# Patient Record
Sex: FEMALE | Race: WHITE | Employment: FULL TIME | URBAN - NONMETROPOLITAN AREA
[De-identification: names, ages, dates, MRNs, and addresses within clinical notes are randomized per-mention and may not be internally consistent; named-entity substitution may affect disease eponyms.]

---

## 2017-01-03 ENCOUNTER — TELEPHONE (OUTPATIENT)
Age: 37
End: 2017-01-03

## 2017-01-05 ENCOUNTER — OFFICE VISIT (OUTPATIENT)
Age: 37
End: 2017-01-05

## 2017-01-05 VITALS
HEIGHT: 68 IN | DIASTOLIC BLOOD PRESSURE: 68 MMHG | SYSTOLIC BLOOD PRESSURE: 142 MMHG | TEMPERATURE: 97.8 F | BODY MASS INDEX: 41.12 KG/M2 | OXYGEN SATURATION: 98 % | WEIGHT: 271.3 LBS | HEART RATE: 100 BPM | RESPIRATION RATE: 16 BRPM

## 2017-01-05 DIAGNOSIS — T81.31XA WOUND DEHISCENCE, INITIAL ENCOUNTER: Primary | ICD-10-CM

## 2017-01-05 PROCEDURE — 99024 POSTOP FOLLOW-UP VISIT: CPT | Performed by: NURSE PRACTITIONER

## 2017-01-05 RX ORDER — SULFAMETHOXAZOLE AND TRIMETHOPRIM 800; 160 MG/1; MG/1
1 TABLET ORAL 2 TIMES DAILY
Qty: 20 TABLET | Refills: 0 | Status: SHIPPED | OUTPATIENT
Start: 2017-01-05 | End: 2017-03-23 | Stop reason: SDUPTHER

## 2017-01-05 ASSESSMENT — ENCOUNTER SYMPTOMS
RECTAL PAIN: 0
COLOR CHANGE: 0
SORE THROAT: 0
DIARRHEA: 0
SINUS PRESSURE: 0
TROUBLE SWALLOWING: 0
CHOKING: 0
EYE ITCHING: 0
CHEST TIGHTNESS: 0
EYE REDNESS: 0
BLOOD IN STOOL: 0
WHEEZING: 0
EYE DISCHARGE: 0
EYE PAIN: 0
ANAL BLEEDING: 0
COUGH: 0
VOMITING: 0
RHINORRHEA: 0
VOICE CHANGE: 0
SHORTNESS OF BREATH: 0
STRIDOR: 0
CONSTIPATION: 0
NAUSEA: 0
FACIAL SWELLING: 0
BACK PAIN: 0
ABDOMINAL DISTENTION: 0
PHOTOPHOBIA: 0
APNEA: 0

## 2017-01-09 ENCOUNTER — TELEPHONE (OUTPATIENT)
Age: 37
End: 2017-01-09

## 2017-01-10 ENCOUNTER — TELEPHONE (OUTPATIENT)
Age: 37
End: 2017-01-10

## 2017-01-10 LAB
AEROBIC CULTURE: ABNORMAL
ANAEROBIC CULTURE: ABNORMAL
GRAM STAIN RESULT: ABNORMAL
ORGANISM: ABNORMAL

## 2017-01-19 ENCOUNTER — OFFICE VISIT (OUTPATIENT)
Age: 37
End: 2017-01-19

## 2017-01-19 VITALS
WEIGHT: 277 LBS | SYSTOLIC BLOOD PRESSURE: 118 MMHG | RESPIRATION RATE: 16 BRPM | HEART RATE: 75 BPM | BODY MASS INDEX: 41.98 KG/M2 | OXYGEN SATURATION: 98 % | DIASTOLIC BLOOD PRESSURE: 80 MMHG | TEMPERATURE: 98.8 F | HEIGHT: 68 IN

## 2017-01-19 DIAGNOSIS — Z51.89 VISIT FOR WOUND CHECK: Primary | ICD-10-CM

## 2017-01-19 PROCEDURE — 99024 POSTOP FOLLOW-UP VISIT: CPT | Performed by: NURSE PRACTITIONER

## 2017-01-19 ASSESSMENT — ENCOUNTER SYMPTOMS
SINUS PRESSURE: 0
WHEEZING: 0
ABDOMINAL PAIN: 0
SHORTNESS OF BREATH: 0
NAUSEA: 0
CHOKING: 0
COUGH: 0
APNEA: 0
ABDOMINAL DISTENTION: 0
RHINORRHEA: 0
DIARRHEA: 0
EYE REDNESS: 0
RECTAL PAIN: 0
STRIDOR: 0
CHEST TIGHTNESS: 0
VOICE CHANGE: 0
TROUBLE SWALLOWING: 0
ANAL BLEEDING: 0
VOMITING: 0
FACIAL SWELLING: 0
EYE PAIN: 0
CONSTIPATION: 0
BLOOD IN STOOL: 0
EYE ITCHING: 0
PHOTOPHOBIA: 0
COLOR CHANGE: 0
SORE THROAT: 0
EYE DISCHARGE: 0
BACK PAIN: 0

## 2017-03-22 ENCOUNTER — TELEPHONE (OUTPATIENT)
Dept: FAMILY MEDICINE CLINIC | Age: 37
End: 2017-03-22

## 2017-03-23 ENCOUNTER — OFFICE VISIT (OUTPATIENT)
Dept: FAMILY MEDICINE CLINIC | Age: 37
End: 2017-03-23

## 2017-03-23 VITALS
HEIGHT: 68 IN | HEART RATE: 76 BPM | DIASTOLIC BLOOD PRESSURE: 78 MMHG | BODY MASS INDEX: 41.92 KG/M2 | SYSTOLIC BLOOD PRESSURE: 128 MMHG | WEIGHT: 276.6 LBS | TEMPERATURE: 98.3 F

## 2017-03-23 DIAGNOSIS — J01.90 ACUTE SINUSITIS, RECURRENCE NOT SPECIFIED, UNSPECIFIED LOCATION: Primary | ICD-10-CM

## 2017-03-23 PROCEDURE — 99213 OFFICE O/P EST LOW 20 MIN: CPT | Performed by: FAMILY MEDICINE

## 2017-03-23 RX ORDER — SULFAMETHOXAZOLE AND TRIMETHOPRIM 800; 160 MG/1; MG/1
1 TABLET ORAL 2 TIMES DAILY
Qty: 20 TABLET | Refills: 0 | Status: SHIPPED | OUTPATIENT
Start: 2017-03-23 | End: 2017-12-01 | Stop reason: SDUPTHER

## 2017-12-01 ENCOUNTER — OFFICE VISIT (OUTPATIENT)
Dept: FAMILY MEDICINE CLINIC | Age: 37
End: 2017-12-01
Payer: COMMERCIAL

## 2017-12-01 VITALS
WEIGHT: 271.4 LBS | BODY MASS INDEX: 41.13 KG/M2 | TEMPERATURE: 97.8 F | SYSTOLIC BLOOD PRESSURE: 130 MMHG | HEIGHT: 68 IN | HEART RATE: 66 BPM | DIASTOLIC BLOOD PRESSURE: 82 MMHG

## 2017-12-01 DIAGNOSIS — J02.9 ACUTE VIRAL PHARYNGITIS: Primary | ICD-10-CM

## 2017-12-01 PROCEDURE — 99213 OFFICE O/P EST LOW 20 MIN: CPT | Performed by: FAMILY MEDICINE

## 2017-12-01 RX ORDER — SULFAMETHOXAZOLE AND TRIMETHOPRIM 800; 160 MG/1; MG/1
1 TABLET ORAL 2 TIMES DAILY
Qty: 20 TABLET | Refills: 0 | Status: SHIPPED | OUTPATIENT
Start: 2017-12-01 | End: 2017-12-11

## 2017-12-02 NOTE — PROGRESS NOTES
Name:  Shaggy Gerber  :    1980    Chief Complaint   Patient presents with    Cough    Sinusitis     sinus pressure       HPI:  2-3 days of sore throat and congestion. Mild cough. OTC no help. Physical Exam:      /82 (Site: Left Arm, Position: Sitting, Cuff Size: Large Adult)   Pulse 66   Temp 97.8 °F (36.6 °C) (Oral)   Ht 5' 8\" (1.727 m)   Wt 271 lb 6.4 oz (123.1 kg)   BMI 41.27 kg/m²     ENT:  TM's clear. Phar is red with edema. No nodes. Lungs are clear. Heart in RRR with no murmur. Assessment/Plan:      Pharyngitis /sinusitis      Pastor Knowles was seen today for cough and sinusitis. Diagnoses and all orders for this visit:    Acute viral pharyngitis  -     sulfamethoxazole-trimethoprim (BACTRIM DS) 800-160 MG per tablet;  Take 1 tablet by mouth 2 times daily for 10 days

## 2018-05-04 ENCOUNTER — OFFICE VISIT (OUTPATIENT)
Dept: FAMILY MEDICINE CLINIC | Age: 38
End: 2018-05-04
Payer: COMMERCIAL

## 2018-05-04 VITALS
BODY MASS INDEX: 42.16 KG/M2 | WEIGHT: 278.2 LBS | SYSTOLIC BLOOD PRESSURE: 138 MMHG | DIASTOLIC BLOOD PRESSURE: 86 MMHG | HEIGHT: 68 IN | HEART RATE: 88 BPM | TEMPERATURE: 99.1 F

## 2018-05-04 DIAGNOSIS — M77.52 TENDONITIS OF ANKLE, LEFT: Primary | ICD-10-CM

## 2018-05-04 PROCEDURE — 99213 OFFICE O/P EST LOW 20 MIN: CPT | Performed by: FAMILY MEDICINE

## 2018-05-04 ASSESSMENT — PATIENT HEALTH QUESTIONNAIRE - PHQ9
SUM OF ALL RESPONSES TO PHQ9 QUESTIONS 1 & 2: 0
SUM OF ALL RESPONSES TO PHQ QUESTIONS 1-9: 0
2. FEELING DOWN, DEPRESSED OR HOPELESS: 0
1. LITTLE INTEREST OR PLEASURE IN DOING THINGS: 0

## 2019-03-29 ENCOUNTER — HOSPITAL ENCOUNTER (OUTPATIENT)
Age: 39
Discharge: HOME | End: 2019-03-29
Payer: COMMERCIAL

## 2019-03-29 ENCOUNTER — OFFICE VISIT (OUTPATIENT)
Dept: FAMILY MEDICINE CLINIC | Age: 39
End: 2019-03-29
Payer: COMMERCIAL

## 2019-03-29 ENCOUNTER — HOSPITAL ENCOUNTER (OUTPATIENT)
Dept: GENERAL RADIOLOGY | Age: 39
Discharge: HOME | End: 2019-03-29
Payer: COMMERCIAL

## 2019-03-29 VITALS
HEART RATE: 80 BPM | WEIGHT: 290.4 LBS | HEIGHT: 68 IN | DIASTOLIC BLOOD PRESSURE: 80 MMHG | BODY MASS INDEX: 44.01 KG/M2 | SYSTOLIC BLOOD PRESSURE: 130 MMHG

## 2019-03-29 DIAGNOSIS — M79.671 RIGHT FOOT PAIN: ICD-10-CM

## 2019-03-29 DIAGNOSIS — S93.491A HIGH ANKLE SPRAIN OF RIGHT LOWER EXTREMITY, INITIAL ENCOUNTER: ICD-10-CM

## 2019-03-29 DIAGNOSIS — M25.571 ACUTE RIGHT ANKLE PAIN: ICD-10-CM

## 2019-03-29 DIAGNOSIS — M25.571 ACUTE RIGHT ANKLE PAIN: Primary | ICD-10-CM

## 2019-03-29 DIAGNOSIS — S92.354A CLOSED NONDISPLACED FRACTURE OF FIFTH METATARSAL BONE OF RIGHT FOOT, INITIAL ENCOUNTER: ICD-10-CM

## 2019-03-29 PROCEDURE — 73610 X-RAY EXAM OF ANKLE: CPT

## 2019-03-29 PROCEDURE — 99214 OFFICE O/P EST MOD 30 MIN: CPT | Performed by: FAMILY MEDICINE

## 2019-03-29 PROCEDURE — 73630 X-RAY EXAM OF FOOT: CPT

## 2019-03-29 ASSESSMENT — ENCOUNTER SYMPTOMS: BACK PAIN: 0

## 2019-03-29 ASSESSMENT — PATIENT HEALTH QUESTIONNAIRE - PHQ9
1. LITTLE INTEREST OR PLEASURE IN DOING THINGS: 0
SUM OF ALL RESPONSES TO PHQ9 QUESTIONS 1 & 2: 0
SUM OF ALL RESPONSES TO PHQ QUESTIONS 1-9: 0
SUM OF ALL RESPONSES TO PHQ QUESTIONS 1-9: 0
2. FEELING DOWN, DEPRESSED OR HOPELESS: 0

## 2019-04-23 ENCOUNTER — HOSPITAL ENCOUNTER (OUTPATIENT)
Age: 39
Discharge: HOME | End: 2019-04-23
Payer: COMMERCIAL

## 2019-04-23 ENCOUNTER — HOSPITAL ENCOUNTER (OUTPATIENT)
Dept: GENERAL RADIOLOGY | Age: 39
Discharge: HOME | End: 2019-04-23
Payer: COMMERCIAL

## 2019-04-23 DIAGNOSIS — S92.354A CLOSED NONDISPLACED FRACTURE OF FIFTH METATARSAL BONE OF RIGHT FOOT, INITIAL ENCOUNTER: ICD-10-CM

## 2019-04-23 PROCEDURE — 73630 X-RAY EXAM OF FOOT: CPT

## 2019-04-24 ENCOUNTER — OFFICE VISIT (OUTPATIENT)
Dept: FAMILY MEDICINE CLINIC | Age: 39
End: 2019-04-24
Payer: COMMERCIAL

## 2019-04-24 VITALS
HEIGHT: 68 IN | HEART RATE: 88 BPM | DIASTOLIC BLOOD PRESSURE: 84 MMHG | WEIGHT: 288.8 LBS | SYSTOLIC BLOOD PRESSURE: 140 MMHG | BODY MASS INDEX: 43.77 KG/M2

## 2019-04-24 DIAGNOSIS — S92.354A CLOSED NONDISPLACED FRACTURE OF FIFTH METATARSAL BONE OF RIGHT FOOT, INITIAL ENCOUNTER: Primary | ICD-10-CM

## 2019-04-24 DIAGNOSIS — M79.671 RIGHT FOOT PAIN: ICD-10-CM

## 2019-04-24 PROCEDURE — 99213 OFFICE O/P EST LOW 20 MIN: CPT | Performed by: FAMILY MEDICINE

## 2019-04-24 NOTE — PROGRESS NOTES
[facility] [place] [address] [address]  Dept: [phone]  Dept Fax: [phone]  Loc: [phone]  PROGRESS NOTE      Visit Date: 4/24/2019    Cooper Rendon is a 45 y.o. female who presents today for:  Chief Complaint   Patient presents with    Ankle Injury       Subjective:  HPI    4 week follow-up for right foot base of fifth metatarsal fracture (pseudo-Flores fracture). She has been in a walking boot weightbearing as tolerated for the past 4 weeks. She is status post about 7 weeks since injury. Repeat x-ray was performed yesterday. She broke the straps on her tall walking boot and now is using a short walking boot. She has been icing. She has lateral foot swelling and pain at base of 5th metatarsal.   She is working 5 days per week and is on her feet all day. Review of Systems   Musculoskeletal: Positive for arthralgias and gait problem. Neurological: Negative for weakness and numbness.      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 Nelson [facility], [facility])    ENDOMETRIAL ABLATION  2012    HERNIA REPAIR  12/16/2016    LAPAROSCOPIC VENTRAL WITH MESH    [facility]    [facility]    TUBAL LIGATION  2012     Family History   Problem Relation Age of Onset    High Blood Pressure Father     Heart Disease Father     High Blood Pressure Brother     Cancer Neg Hx     Diabetes Neg Hx     Stroke Neg Hx      Social History     Tobacco Use    Smoking status: Never Smoker    Smokeless tobacco: Never Used   Substance Use Topics    Alcohol use: Yes     Comment: occasional      Current Outpatient Medications   Medication Sig Dispense Refill    Probiotic Product (PROBIOTIC PO) Take by mouth daily      furosemide (LASIX) 20 MG tablet Take 1 tablet by mouth daily as needed 30 tablet 1     No current facility-administered medications for this visit. Allergies   Allergen Reactions    Ceclor [Cefaclor] Rash     Health Maintenance   Topic Date Due    Hib Vaccine (1 of 1 - Risk 1-dose series) 12/11/1981    Meningococcal (ACWY) Vaccine (1 - Risk 2-dose series) 09/11/1982    Pneumococcal 0-64 years Vaccine (1 of 3 - PCV13) 09/11/1986    Varicella Vaccine (1 of 2 - 13+ 2-dose series) 09/11/1993    HIV screen  09/11/1995    DTaP/Tdap/Td vaccine (1 - Tdap) 07/03/2013    Potassium monitoring  12/18/2017    Creatinine monitoring  12/18/2017    Cervical cancer screen  10/20/2018    Flu vaccine (Season Ended) 09/01/2019    HPV vaccine  Aged Out       Objective:  BP (!) 154/92 (Site: Left Upper Arm, Position: Sitting, Cuff Size: Large Adult)   Pulse 88   Ht 5' 8\" (1.727 m)   Wt 288 lb 12.8 oz (131 kg)   BMI 43.91 kg/m²   Physical Exam   Constitutional: She is oriented to person, place, and time. She appears well-developed and well-nourished. Neurological: She is alert and oriented to person, place, and time. Psychiatric: She has a normal mood and affect. Her behavior is normal.   Vitals reviewed. Right ankle: Inspection:   Swelling of lateral ankle, syndesmosis, and around base of 5th metatarsal.    No ecchymosis or redness. ROM:  Dorsiflexion 10 degrees  Plantarflexion WNL  Inversion WNL  Eversion WNL   Tenderness:  ttp of base of 5th metarsal. No ttp of lateral malleolus and syndesmosis.    No tenderness of proximal fibula, medial malleolus, or deltoid ligament. Strength:  deferred   Bump Test of Heel:  negative   Squeeze Test:   negative   Anterior Drawer:  positive   Talar Tilt:  positive   External Rotation test:  positive      Right foot:  Positive long bone compression of 5th ray. Positive tap test of 5th metatarsal.      Distal sensation and pulses in RLE. Calf is soft and nontender. Negative Deniz sign of right leg.      Imaging:  Right foot xrays:  from 4/23/19. Obtained and reviewed. Base of 5th metatarsal fracture in distal zone1 that is minimally displaced. there appears to be some bridging trabeculation present. Calcaneal spur is present. Impression/Plan:  1. Closed nondisplaced fracture of fifth metatarsal bone of right foot, initial encounter  Distal zone 1 of 5 metatarsal base fracture (pseudo-Flores fracture). Status post 7 weeks since injury and about 4 weeks since initiating treatment. Continue short walking boot with weightbearing as tolerated. Still having pain. X-ray shows early signs of healing. Reassured patient. Discussed that this can take 12 weeks to heal.  Discussed the possibility of a nonunion or malunion and in that case we would refer her to orthopedics for possible ORIF. Repeat xray in 4 weeks prior to appt. - XR FOOT RIGHT (MIN 3 VIEWS); Future    2. Right foot pain  As above      They voiced understanding. All questions answered. They agreed with treatment plan. See patient instructions for any educational materials that may have been given. Discussed use, benefit, and side effects of prescribed medications. Reviewed health maintenance. (Please note that portions of this note may have been completed with a voice recognition program.  Efforts were made to edit the dictation but occasionally words are mis-transcribed.)    Return in about 1 month (around 5/22/2019).       Electronically signed by Leander Smith MD on 4/24/2019 at 8:59 AM

## 2019-05-24 ENCOUNTER — HOSPITAL ENCOUNTER (OUTPATIENT)
Age: 39
Discharge: HOME | End: 2019-05-24
Payer: COMMERCIAL

## 2019-05-24 ENCOUNTER — HOSPITAL ENCOUNTER (OUTPATIENT)
Dept: GENERAL RADIOLOGY | Age: 39
Discharge: HOME | End: 2019-05-24
Payer: COMMERCIAL

## 2019-05-24 DIAGNOSIS — S92.354A CLOSED NONDISPLACED FRACTURE OF FIFTH METATARSAL BONE OF RIGHT FOOT, INITIAL ENCOUNTER: ICD-10-CM

## 2019-05-24 PROCEDURE — 73630 X-RAY EXAM OF FOOT: CPT

## 2019-05-28 ENCOUNTER — OFFICE VISIT (OUTPATIENT)
Dept: FAMILY MEDICINE CLINIC | Age: 39
End: 2019-05-28
Payer: COMMERCIAL

## 2019-05-28 VITALS
HEART RATE: 74 BPM | WEIGHT: 286 LBS | BODY MASS INDEX: 43.35 KG/M2 | DIASTOLIC BLOOD PRESSURE: 76 MMHG | SYSTOLIC BLOOD PRESSURE: 122 MMHG | HEIGHT: 68 IN

## 2019-05-28 DIAGNOSIS — S92.354A CLOSED NONDISPLACED FRACTURE OF FIFTH METATARSAL BONE OF RIGHT FOOT, INITIAL ENCOUNTER: Primary | ICD-10-CM

## 2019-05-28 DIAGNOSIS — M79.671 RIGHT FOOT PAIN: ICD-10-CM

## 2019-05-28 PROCEDURE — 99213 OFFICE O/P EST LOW 20 MIN: CPT | Performed by: FAMILY MEDICINE

## 2019-05-28 NOTE — PROGRESS NOTES
[facility]  [facility] [address] [address]  Dept: [phone]  Dept Fax: [phone]  Loc: [phone]  PROGRESS NOTE      Visit Date: 5/28/2019    Phoenix Wang is a 45 y.o. female who presents today for:  Chief Complaint   Patient presents with    1 Month Follow-Up     rt foot fracture much better       Subjective:  HPI    4 week follow-up for right foot base of fifth metatarsal fracture (pseudo-Flores fracture). She has been in a walking boot weightbearing as tolerated for the past 8 weeks. She is status post about 11 weeks since injury. Repeat x-ray was performed last week. Having less the pain the past 1 week. She is working 5 days per week and is on her feet all day. Review of Systems   Musculoskeletal: Positive for arthralgias and gait problem. Neurological: Negative for weakness and numbness.      Patient Active Problem List   Diagnosis    Hypertension    GERD (gastroesophageal reflux disease)    Thrombocytopenia (HCC)    Status post splenectomy---2015    Ventral hernia without obstruction or gangrene    Closed nondisplaced fracture of fifth right metatarsal bone    High ankle sprain of right lower extremity     Past Medical History:   Diagnosis Date    Hx of blood clots 2012    LUNGS    Pancreatitis     PE (pulmonary embolism) 2012    Platelets decreased [facility])       Past Surgical History:   Procedure Laterality Date    CHOLECYSTECTOMY  21 Nov 2014    Cholecystectomy Laparoscopic, converted to open ([facility]. Dahlia Ambriz, [facility])    ENDOMETRIAL ABLATION  2012    HERNIA REPAIR  12/16/2016    LAPAROSCOPIC VENTRAL WITH MESH    S[facility]EN[facility]OMY  3-27-15    [facility]    TUBAL LIGATION  2012     Family History   Problem Relation Age of Onset    High Blood Pressure Father     Heart Disease Father     High Blood Pressure Brother     Cancer Neg Hx     Diabetes Neg Hx     Stroke Neg Hx      Social History     Tobacco Use    Smoking status: Never Smoker    Smokeless tobacco: Never Used   Substance Use Topics    Alcohol use: Yes     Comment: occasional      Current Outpatient Medications   Medication Sig Dispense Refill    Probiotic Product (PROBIOTIC PO) Take by mouth daily      furosemide (LASIX) 20 MG tablet Take 1 tablet by mouth daily as needed 30 tablet 1     No current facility-administered medications for this visit. Allergies   Allergen Reactions    Ceclor [Cefaclor] Rash     Health Maintenance   Topic Date Due    Hib Vaccine (1 of 1 - Risk 1-dose series) 12/11/1981    Meningococcal (ACWY) Vaccine (1 - Risk 2-dose series) 09/11/1982    Pneumococcal 0-64 years Vaccine (1 of 3 - PCV13) 09/11/1986    Varicella Vaccine (1 of 2 - 13+ 2-dose series) 09/11/1993    HIV screen  09/11/1995    DTaP/Tdap/Td vaccine (1 - Tdap) 07/03/2013    Potassium monitoring  12/18/2017    Creatinine monitoring  12/18/2017    Cervical cancer screen  10/20/2018    Flu vaccine (Season Ended) 09/01/2019    HPV vaccine  Aged Out       Objective:  /76 (Site: Left Upper Arm, Position: Sitting, Cuff Size: Large Adult)   Pulse 74   Ht 5' 8\" (1.727 m)   Wt 286 lb (129.7 kg)   BMI 43.49 kg/m²   Physical Exam   Constitutional: She is oriented to person, place, and time. She appears well-developed and well-nourished. Neurological: She is alert and oriented to person, place, and time. Psychiatric: She has a normal mood and affect. Her behavior is normal.   Vitals reviewed. Right ankle: Inspection:   Swelling of lateral ankle. No ecchymosis or redness. ROM:  Dorsiflexion 10 degrees  Plantarflexion WNL  Inversion WNL  Eversion WNL   Tenderness:  ttp of base of 5th metarsal. mild ttp of lateral malleolus. No ttp syndesmosis.     Strength:  deferred   Squeeze Test:   negative   Anterior Drawer:  positive   Talar Tilt:  positive   External Rotation test:  negative      Right foot:  negative long bone compression of 5th ray.  negative tap test of 5th metatarsal.      Distal sensation and pulses in RLE. Calf is soft and nontender.     Imaging:  Right foot xrays:  from 5/24/19. Obtained and reviewed. Base of 5th metatarsal fracture in distal zone 1 that is minimally displaced. no obvious callus formation. Distal fibula avulsion fracture is present. Calcaneal spur is present. Impression/Plan:  1. Closed nondisplaced fracture of fifth metatarsal bone of right foot, initial encounter  Distal zone 1 of 5 metatarsal base fracture (pseudo-Flores fracture). Status post11 weeks since injury and about 8 weeks since initiating treatment. No xray evidence of elsie healing. Continue short walking boot with weightbearing as tolerated. Reassured patient. Discussed that this can take 12 weeks to heal.  Discussed the possibility of a nonunion or malunion and in that case we would refer her to orthopedics for possible ORIF. She wants to avoid surgery. Repeat xray in 5 weeks prior to appt. - XR FOOT RIGHT (MIN 3 VIEWS); Future    2. Right foot pain  - XR FOOT RIGHT (MIN 3 VIEWS); Future    They voiced understanding. All questions answered. They agreed with treatment plan. See patient instructions for any educational materials that may have been given. Discussed use, benefit, and side effects of prescribed medications. Reviewed health maintenance. (Please note that portions of this note may have been completed with a voice recognition program.  Efforts were made to edit the dictation but occasionally words are mis-transcribed.)    Return in about 5 weeks (around 7/2/2019) for right foot pain. Consider CT foot and ortho referral if no xray evidence of healing at next appt.        Electronically signed by Gerardo Laurent MD on 5/28/2019 at 9:36 AM

## 2019-05-28 NOTE — LETTER
[facility]  [address]  Office #:  [id number] Grady Kamara MD      05/28/19    Patient: Huma Chatterjee   YOB: 1980   Date of Visit: 05/28/19     To Whom it May Concern:    Huma Chatterjee was seen in my clinic on 05/28/19. She may return to work on today. If you have any questions or concerns, please don't hesitate to call.     Sincerely,         Amirah Begum MD

## 2019-06-28 ENCOUNTER — TELEPHONE (OUTPATIENT)
Dept: FAMILY MEDICINE CLINIC | Age: 39
End: 2019-06-28

## 2019-06-28 ENCOUNTER — HOSPITAL ENCOUNTER (OUTPATIENT)
Age: 39
Discharge: HOME | End: 2019-06-28
Payer: COMMERCIAL

## 2019-06-28 ENCOUNTER — HOSPITAL ENCOUNTER (OUTPATIENT)
Dept: GENERAL RADIOLOGY | Age: 39
Discharge: HOME | End: 2019-06-28
Payer: COMMERCIAL

## 2019-06-28 DIAGNOSIS — M79.671 RIGHT FOOT PAIN: ICD-10-CM

## 2019-06-28 DIAGNOSIS — S92.354G CLOSED NONDISPLACED FRACTURE OF FIFTH METATARSAL BONE OF RIGHT FOOT WITH DELAYED HEALING, SUBSEQUENT ENCOUNTER: Primary | ICD-10-CM

## 2019-06-28 DIAGNOSIS — S92.354A CLOSED NONDISPLACED FRACTURE OF FIFTH METATARSAL BONE OF RIGHT FOOT, INITIAL ENCOUNTER: ICD-10-CM

## 2019-06-28 PROCEDURE — 73630 X-RAY EXAM OF FOOT: CPT

## 2019-07-03 ENCOUNTER — HOSPITAL ENCOUNTER (OUTPATIENT)
Dept: CT IMAGING | Age: 39
Discharge: HOME | End: 2019-07-03
Payer: COMMERCIAL

## 2019-07-03 ENCOUNTER — OFFICE VISIT (OUTPATIENT)
Dept: FAMILY MEDICINE CLINIC | Age: 39
End: 2019-07-03
Payer: COMMERCIAL

## 2019-07-03 VITALS
BODY MASS INDEX: 43.65 KG/M2 | DIASTOLIC BLOOD PRESSURE: 82 MMHG | HEIGHT: 68 IN | WEIGHT: 288 LBS | SYSTOLIC BLOOD PRESSURE: 130 MMHG

## 2019-07-03 DIAGNOSIS — S92.354G CLOSED NONDISPLACED FRACTURE OF FIFTH METATARSAL BONE OF RIGHT FOOT WITH DELAYED HEALING, SUBSEQUENT ENCOUNTER: ICD-10-CM

## 2019-07-03 DIAGNOSIS — M79.671 RIGHT FOOT PAIN: ICD-10-CM

## 2019-07-03 DIAGNOSIS — S92.354K CLOSED NONDISPLACED FRACTURE OF FIFTH METATARSAL BONE OF RIGHT FOOT WITH NONUNION, SUBSEQUENT ENCOUNTER: Primary | ICD-10-CM

## 2019-07-03 PROCEDURE — 73700 CT LOWER EXTREMITY W/O DYE: CPT

## 2019-07-03 PROCEDURE — 99213 OFFICE O/P EST LOW 20 MIN: CPT | Performed by: FAMILY MEDICINE

## 2019-07-08 ENCOUNTER — TELEPHONE (OUTPATIENT)
Dept: FAMILY MEDICINE CLINIC | Age: 39
End: 2019-07-08

## 2020-08-19 ENCOUNTER — OFFICE VISIT (OUTPATIENT)
Dept: FAMILY MEDICINE CLINIC | Age: 40
End: 2020-08-19
Payer: COMMERCIAL

## 2020-08-19 ENCOUNTER — HOSPITAL ENCOUNTER (OUTPATIENT)
Age: 40
Discharge: HOME | End: 2020-08-19
Payer: COMMERCIAL

## 2020-08-19 VITALS
SYSTOLIC BLOOD PRESSURE: 132 MMHG | HEIGHT: 67 IN | HEART RATE: 104 BPM | TEMPERATURE: 97.9 F | OXYGEN SATURATION: 97 % | DIASTOLIC BLOOD PRESSURE: 82 MMHG | BODY MASS INDEX: 43.44 KG/M2 | WEIGHT: 276.8 LBS

## 2020-08-19 DIAGNOSIS — R10.13 EPIGASTRIC PAIN: ICD-10-CM

## 2020-08-19 LAB
BASOPHILS # BLD: 0.4 %
BASOPHILS ABSOLUTE: 0 THOU/MM3 (ref 0–0.1)
EOSINOPHIL # BLD: 2.6 %
EOSINOPHILS ABSOLUTE: 0.2 THOU/MM3 (ref 0–0.4)
ERYTHROCYTE [DISTWIDTH] IN BLOOD BY AUTOMATED COUNT: 13.5 % (ref 11.5–14.5)
ERYTHROCYTE [DISTWIDTH] IN BLOOD BY AUTOMATED COUNT: 50.5 FL (ref 35–45)
HCT VFR BLD CALC: 43.2 % (ref 37–47)
HEMOGLOBIN: 14.2 GM/DL (ref 12–16)
IMMATURE GRANS (ABS): 0.01 THOU/MM3 (ref 0–0.07)
IMMATURE GRANULOCYTES: 0.1 %
LYMPHOCYTES # BLD: 21.6 %
LYMPHOCYTES ABSOLUTE: 1.7 THOU/MM3 (ref 1–4.8)
MCH RBC QN AUTO: 33.3 PG (ref 26–33)
MCHC RBC AUTO-ENTMCNC: 32.9 GM/DL (ref 32.2–35.5)
MCV RBC AUTO: 101.2 FL (ref 81–99)
MONOCYTES # BLD: 19.6 %
MONOCYTES ABSOLUTE: 1.5 THOU/MM3 (ref 0.4–1.3)
NUCLEATED RED BLOOD CELLS: 0 /100 WBC
PLATELET # BLD: 234 THOU/MM3 (ref 130–400)
PMV BLD AUTO: 11.2 FL (ref 9.4–12.4)
RBC # BLD: 4.27 MILL/MM3 (ref 4.2–5.4)
SEG NEUTROPHILS: 55.7 %
SEGMENTED NEUTROPHILS ABSOLUTE COUNT: 4.3 THOU/MM3 (ref 1.8–7.7)
WBC # BLD: 7.7 THOU/MM3 (ref 4.8–10.8)

## 2020-08-19 PROCEDURE — 36415 COLL VENOUS BLD VENIPUNCTURE: CPT

## 2020-08-19 PROCEDURE — 82150 ASSAY OF AMYLASE: CPT

## 2020-08-19 PROCEDURE — 80053 COMPREHEN METABOLIC PANEL: CPT

## 2020-08-19 PROCEDURE — 99213 OFFICE O/P EST LOW 20 MIN: CPT | Performed by: FAMILY MEDICINE

## 2020-08-19 PROCEDURE — 83690 ASSAY OF LIPASE: CPT

## 2020-08-19 PROCEDURE — 85025 COMPLETE CBC W/AUTO DIFF WBC: CPT

## 2020-08-19 RX ORDER — OMEPRAZOLE 40 MG/1
40 CAPSULE, DELAYED RELEASE ORAL DAILY
Qty: 30 CAPSULE | Refills: 2 | Status: SHIPPED | OUTPATIENT
Start: 2020-08-19 | End: 2020-09-04 | Stop reason: SDUPTHER

## 2020-08-19 RX ORDER — SUCRALFATE 1 G/1
1 TABLET ORAL 4 TIMES DAILY
Qty: 120 TABLET | Refills: 0 | Status: SHIPPED | OUTPATIENT
Start: 2020-08-19 | End: 2020-09-04

## 2020-08-19 ASSESSMENT — ENCOUNTER SYMPTOMS
NAUSEA: 0
RECTAL PAIN: 0
WHEEZING: 0
ABDOMINAL PAIN: 1
APNEA: 0
DIARRHEA: 0
SHORTNESS OF BREATH: 0
COUGH: 0
SORE THROAT: 0
CONSTIPATION: 0
ABDOMINAL DISTENTION: 0
VOMITING: 0
TROUBLE SWALLOWING: 0

## 2020-08-19 NOTE — PROGRESS NOTES
[address] [place] [address] [address], [place]  Phone:  [phone]  TJA:[phone]       Name: Calvin Dumont  : 1980    Chief Complaint   Patient presents with    Abdominal Pain     pt states the pain radiates into the back    Bloated       HPI:     Calvin Dumont is a 44 y.o. female who presents today for     HPI    Saturday started with pain in her epigastric area, drinking more coffee/lemonade. 2 jobs. Lots of stress. Yesterday morning pain was moderate to severe. H/o HH hernia, s/p noemy  Standing hurtings. Famotidine helping some   Pain into her back is concerning. No N/V. Stool loser than normal, diet has been changed. Taking probiotic. No melena/hematochezia/hemoptysis. No fam hx pancreatitis. She has personal history of pancreatitis but it was felt to be related to gallbladder disease  Past Surgical History:   Procedure Laterality Date    CHOLECYSTECTOMY  2014    Cholecystectomy Laparoscopic, converted to open ([facility]. Елена Coulter, [facility])    ENDOMETRIAL ABLATION      HERNIA REPAIR  2016    LAPAROSCOPIC VENTRAL WITH MESH    SPLENECTOMY  3-27-15    [facility]    TUBAL LIGATION           Current Outpatient Medications:     sucralfate (CARAFATE) 1 GM tablet, Take 1 tablet by mouth 4 times daily, Disp: 120 tablet, Rfl: 0    omeprazole (PRILOSEC) 40 MG delayed release capsule, Take 1 capsule by mouth daily, Disp: 30 capsule, Rfl: 2    Probiotic Product (PROBIOTIC PO), Take by mouth daily, Disp: , Rfl:     furosemide (LASIX) 20 MG tablet, Take 1 tablet by mouth daily as needed, Disp: 30 tablet, Rfl: 1    Allergies   Allergen Reactions    Ceclor [Cefaclor] Rash       Review of Systems   Constitutional: Negative for fatigue and fever. HENT: Negative for sore throat and trouble swallowing. Eyes: Negative for visual disturbance. Respiratory: Negative for apnea, cough, shortness of breath and wheezing.     Cardiovascular: Negative for chest pain and leg swelling. Gastrointestinal: Positive for abdominal pain. Negative for abdominal distention, constipation, diarrhea, nausea, rectal pain and vomiting. Genitourinary: Negative for difficulty urinating. Psychiatric/Behavioral: The patient is not nervous/anxious. Objective:     /82 (Site: Left Upper Arm, Position: Sitting)   Pulse 104   Temp 97.9 °F (36.6 °C)   Ht 5' 7\" (1.702 m)   Wt 276 lb 12.8 oz (125.6 kg)   SpO2 97%   BMI 43.35 kg/m²     Physical Exam  Constitutional:       General: She is not in acute distress. Appearance: Normal appearance. She is not ill-appearing. Cardiovascular:      Rate and Rhythm: Normal rate and regular rhythm. Heart sounds: No murmur. Pulmonary:      Effort: Pulmonary effort is normal. No respiratory distress. Breath sounds: Normal breath sounds. No wheezing. Abdominal:      General: Abdomen is flat. There is no distension. Palpations: Abdomen is soft. There is no mass. Tenderness: There is abdominal tenderness (epigastric region with radiation to the megan). There is no guarding. Hernia: No hernia is present. Musculoskeletal:         General: No swelling. Neurological:      Mental Status: She is alert. Psychiatric:         Mood and Affect: Mood normal.         Thought Content: Thought content normal.         Judgment: Judgment normal.         Assessment/Plan:     Nicholas Heredia was seen today for abdominal pain and bloated. Diagnoses and all orders for this visit:    Epigastric pain  -     Comprehensive Metabolic Panel; Future  -     CBC Auto Differential; Future  -     Lipase; Future  -     Amylase; Future  -     sucralfate (CARAFATE) 1 GM tablet; Take 1 tablet by mouth 4 times daily  -     omeprazole (PRILOSEC) 40 MG delayed release capsule; Take 1 capsule by mouth daily    History of peptic ulcer disease  -     sucralfate (CARAFATE) 1 GM tablet;  Take 1 tablet by mouth 4 times daily  -     omeprazole (PRILOSEC) 40 MG delayed release capsule; Take 1 capsule by mouth daily    Patient with significant history of abdominal issues. Due to the pain and radiation will do work-up as above. Currently her abdomen does not suggest a bowel obstruction or something that would benefit from a imaging study. We will start medication above and if not improving and laboratory work-up is unremarkable then will consider an EGD with referral to specialist.  H. pylori testing also considered. Long-term challenge is with increase acidity due to stress would be stress management. Return for pending work up and improvement of condition. No future appointments.      Electronically signed by Stef Fields MD on 8/19/2020 at 5:16 PM

## 2020-08-19 NOTE — LETTER
[address] [facility]  [address]  [facility] [address]  Phone: [phone]  Fax: [phone]    Bridget Goodell, MD        August 19, 2020     Patient: Raz Moreno   YOB: 1980   Date of Visit: 8/19/2020       To Whom It May Concern: It is my medical opinion that Leonie Yates may return to work on 8/22/2020. If you have any questions or concerns, please don't hesitate to call.     Sincerely,        Bridget Goodell, MD

## 2020-08-20 LAB
ALBUMIN SERPL-MCNC: 4 G/DL (ref 3.5–5.1)
ALP BLD-CCNC: 85 U/L (ref 38–126)
ALT SERPL-CCNC: 23 U/L (ref 11–66)
AMYLASE: 54 U/L (ref 20–104)
ANION GAP SERPL CALCULATED.3IONS-SCNC: 12 MEQ/L (ref 8–16)
AST SERPL-CCNC: 27 U/L (ref 5–40)
BILIRUB SERPL-MCNC: 0.6 MG/DL (ref 0.3–1.2)
BUN BLDV-MCNC: 11 MG/DL (ref 7–22)
CALCIUM SERPL-MCNC: 9.2 MG/DL (ref 8.5–10.5)
CHLORIDE BLD-SCNC: 105 MEQ/L (ref 98–111)
CO2: 24 MEQ/L (ref 23–33)
CREAT SERPL-MCNC: 0.6 MG/DL (ref 0.4–1.2)
GFR SERPL CREATININE-BSD FRML MDRD: > 90 ML/MIN/1.73M2
GLUCOSE BLD-MCNC: 86 MG/DL (ref 70–108)
LIPASE: 33.3 U/L (ref 5.6–51.3)
POTASSIUM SERPL-SCNC: 4.3 MEQ/L (ref 3.5–5.2)
SODIUM BLD-SCNC: 141 MEQ/L (ref 135–145)
TOTAL PROTEIN: 7.4 G/DL (ref 6.1–8)

## 2020-08-24 ENCOUNTER — APPOINTMENT (OUTPATIENT)
Dept: MRI IMAGING | Age: 40
DRG: 438 | End: 2020-08-24
Payer: COMMERCIAL

## 2020-08-24 ENCOUNTER — APPOINTMENT (OUTPATIENT)
Dept: CT IMAGING | Age: 40
DRG: 438 | End: 2020-08-24
Payer: COMMERCIAL

## 2020-08-24 ENCOUNTER — APPOINTMENT (OUTPATIENT)
Dept: ULTRASOUND IMAGING | Age: 40
DRG: 438 | End: 2020-08-24
Payer: COMMERCIAL

## 2020-08-24 ENCOUNTER — HOSPITAL ENCOUNTER (INPATIENT)
Age: 40
LOS: 4 days | Discharge: HOME | DRG: 438 | End: 2020-08-28
Attending: FAMILY MEDICINE | Admitting: INTERNAL MEDICINE
Payer: COMMERCIAL

## 2020-08-24 PROBLEM — K85.90 ACUTE PANCREATITIS WITHOUT INFECTION OR NECROSIS: Status: ACTIVE | Noted: 2020-08-24

## 2020-08-24 PROBLEM — K85.90 ACUTE PANCREATITIS: Status: ACTIVE | Noted: 2020-08-24

## 2020-08-24 LAB
ALBUMIN SERPL-MCNC: 3.8 G/DL (ref 3.5–5.1)
ALP BLD-CCNC: 251 U/L (ref 38–126)
ALT SERPL-CCNC: 205 U/L (ref 11–66)
AMYLASE: 37 U/L (ref 20–104)
ANION GAP SERPL CALCULATED.3IONS-SCNC: 11 MEQ/L (ref 8–16)
AST SERPL-CCNC: 216 U/L (ref 5–40)
BACTERIA: ABNORMAL
BASOPHILS # BLD: 0.3 %
BASOPHILS ABSOLUTE: 0 THOU/MM3 (ref 0–0.1)
BILIRUB SERPL-MCNC: 1.9 MG/DL (ref 0.3–1.2)
BILIRUBIN URINE: NEGATIVE
BLOOD, URINE: ABNORMAL
BUN BLDV-MCNC: 11 MG/DL (ref 7–22)
CALCIUM SERPL-MCNC: 9.3 MG/DL (ref 8.5–10.5)
CASTS: ABNORMAL /LPF
CASTS: ABNORMAL /LPF
CHARACTER, URINE: CLEAR
CHLORIDE BLD-SCNC: 99 MEQ/L (ref 98–111)
CO2: 27 MEQ/L (ref 23–33)
COLOR: YELLOW
CREAT SERPL-MCNC: 0.6 MG/DL (ref 0.4–1.2)
CRYSTALS: ABNORMAL
EOSINOPHIL # BLD: 0.5 %
EOSINOPHILS ABSOLUTE: 0 THOU/MM3 (ref 0–0.4)
EPITHELIAL CELLS, UA: ABNORMAL /HPF
ERYTHROCYTE [DISTWIDTH] IN BLOOD BY AUTOMATED COUNT: 12.7 % (ref 11.5–14.5)
ERYTHROCYTE [DISTWIDTH] IN BLOOD BY AUTOMATED COUNT: 45.9 FL (ref 35–45)
GFR SERPL CREATININE-BSD FRML MDRD: > 90 ML/MIN/1.73M2
GLUCOSE BLD-MCNC: 87 MG/DL (ref 70–108)
GLUCOSE, URINE: NEGATIVE MG/DL
HCT VFR BLD CALC: 42.5 % (ref 37–47)
HEMOGLOBIN: 14.4 GM/DL (ref 12–16)
IMMATURE GRANS (ABS): 0.02 THOU/MM3 (ref 0–0.07)
IMMATURE GRANULOCYTES: 0.3 %
KETONES, URINE: 15
LEUKOCYTE EST, POC: ABNORMAL
LIPASE: 15.7 U/L (ref 5.6–51.3)
LYMPHOCYTES # BLD: 18.5 %
LYMPHOCYTES ABSOLUTE: 1.5 THOU/MM3 (ref 1–4.8)
MCH RBC QN AUTO: 33.6 PG (ref 26–33)
MCHC RBC AUTO-ENTMCNC: 33.9 GM/DL (ref 32.2–35.5)
MCV RBC AUTO: 99.1 FL (ref 81–99)
MISCELLANEOUS LAB TEST RESULT: ABNORMAL
MONOCYTES # BLD: 17.3 %
MONOCYTES ABSOLUTE: 1.4 THOU/MM3 (ref 0.4–1.3)
NITRITE, URINE: NEGATIVE
NUCLEATED RED BLOOD CELLS: 0 /100 WBC
OSMOLALITY CALCULATION: 272.6 MOSMOL/KG (ref 275–300)
PH UA: 5.5 (ref 5–9)
PLATELET # BLD: 295 THOU/MM3 (ref 130–400)
PMV BLD AUTO: 10.1 FL (ref 9.4–12.4)
POTASSIUM REFLEX MAGNESIUM: 4.2 MEQ/L (ref 3.5–5.2)
PROTEIN UA: NEGATIVE MG/DL
RBC # BLD: 4.29 MILL/MM3 (ref 4.2–5.4)
RBC URINE: ABNORMAL /HPF
RENAL EPITHELIAL, UA: ABNORMAL
SEG NEUTROPHILS: 63.1 %
SEGMENTED NEUTROPHILS ABSOLUTE COUNT: 5 THOU/MM3 (ref 1.8–7.7)
SODIUM BLD-SCNC: 137 MEQ/L (ref 135–145)
SPECIFIC GRAVITY UA: > 1.03 (ref 1–1.03)
TOTAL PROTEIN: 7.2 G/DL (ref 6.1–8)
UROBILINOGEN, URINE: 1 EU/DL (ref 0–1)
WBC # BLD: 7.9 THOU/MM3 (ref 4.8–10.8)
WBC UA: ABNORMAL /HPF
YEAST: ABNORMAL

## 2020-08-24 PROCEDURE — 99284 EMERGENCY DEPT VISIT MOD MDM: CPT

## 2020-08-24 PROCEDURE — 96375 TX/PRO/DX INJ NEW DRUG ADDON: CPT

## 2020-08-24 PROCEDURE — 74183 MRI ABD W/O CNTR FLWD CNTR: CPT

## 2020-08-24 PROCEDURE — 6370000000 HC RX 637 (ALT 250 FOR IP): Performed by: INTERNAL MEDICINE

## 2020-08-24 PROCEDURE — 74177 CT ABD & PELVIS W/CONTRAST: CPT

## 2020-08-24 PROCEDURE — 6360000002 HC RX W HCPCS: Performed by: FAMILY MEDICINE

## 2020-08-24 PROCEDURE — 93975 VASCULAR STUDY: CPT

## 2020-08-24 PROCEDURE — 2500000003 HC RX 250 WO HCPCS: Performed by: FAMILY MEDICINE

## 2020-08-24 PROCEDURE — 99223 1ST HOSP IP/OBS HIGH 75: CPT | Performed by: INTERNAL MEDICINE

## 2020-08-24 PROCEDURE — 6360000004 HC RX CONTRAST MEDICATION: Performed by: FAMILY MEDICINE

## 2020-08-24 PROCEDURE — 2140000000 HC CCU INTERMEDIATE R&B

## 2020-08-24 PROCEDURE — 2580000003 HC RX 258: Performed by: INTERNAL MEDICINE

## 2020-08-24 PROCEDURE — 6360000002 HC RX W HCPCS: Performed by: INTERNAL MEDICINE

## 2020-08-24 PROCEDURE — 76705 ECHO EXAM OF ABDOMEN: CPT

## 2020-08-24 PROCEDURE — 2580000003 HC RX 258: Performed by: FAMILY MEDICINE

## 2020-08-24 PROCEDURE — 82150 ASSAY OF AMYLASE: CPT

## 2020-08-24 PROCEDURE — 96374 THER/PROPH/DIAG INJ IV PUSH: CPT

## 2020-08-24 PROCEDURE — 80053 COMPREHEN METABOLIC PANEL: CPT

## 2020-08-24 PROCEDURE — A9579 GAD-BASE MR CONTRAST NOS,1ML: HCPCS | Performed by: INTERNAL MEDICINE

## 2020-08-24 PROCEDURE — 6360000004 HC RX CONTRAST MEDICATION: Performed by: INTERNAL MEDICINE

## 2020-08-24 PROCEDURE — 85025 COMPLETE CBC W/AUTO DIFF WBC: CPT

## 2020-08-24 PROCEDURE — 99283 EMERGENCY DEPT VISIT LOW MDM: CPT

## 2020-08-24 PROCEDURE — 2709999900 HC NON-CHARGEABLE SUPPLY

## 2020-08-24 PROCEDURE — 36415 COLL VENOUS BLD VENIPUNCTURE: CPT

## 2020-08-24 PROCEDURE — 81001 URINALYSIS AUTO W/SCOPE: CPT

## 2020-08-24 PROCEDURE — 83690 ASSAY OF LIPASE: CPT

## 2020-08-24 RX ORDER — PROMETHAZINE HYDROCHLORIDE 25 MG/1
12.5 TABLET ORAL EVERY 6 HOURS PRN
Status: DISCONTINUED | OUTPATIENT
Start: 2020-08-24 | End: 2020-08-28 | Stop reason: HOSPADM

## 2020-08-24 RX ORDER — MORPHINE SULFATE 4 MG/ML
4 INJECTION, SOLUTION INTRAMUSCULAR; INTRAVENOUS ONCE
Status: COMPLETED | OUTPATIENT
Start: 2020-08-24 | End: 2020-08-24

## 2020-08-24 RX ORDER — ACETAMINOPHEN 650 MG/1
650 SUPPOSITORY RECTAL EVERY 6 HOURS PRN
Status: DISCONTINUED | OUTPATIENT
Start: 2020-08-24 | End: 2020-08-28 | Stop reason: HOSPADM

## 2020-08-24 RX ORDER — ONDANSETRON 2 MG/ML
4 INJECTION INTRAMUSCULAR; INTRAVENOUS EVERY 6 HOURS PRN
Status: DISCONTINUED | OUTPATIENT
Start: 2020-08-24 | End: 2020-08-28 | Stop reason: HOSPADM

## 2020-08-24 RX ORDER — POLYETHYLENE GLYCOL 3350 17 G/17G
17 POWDER, FOR SOLUTION ORAL DAILY PRN
Status: DISCONTINUED | OUTPATIENT
Start: 2020-08-24 | End: 2020-08-28 | Stop reason: HOSPADM

## 2020-08-24 RX ORDER — SODIUM CHLORIDE 0.9 % (FLUSH) 0.9 %
10 SYRINGE (ML) INJECTION PRN
Status: DISCONTINUED | OUTPATIENT
Start: 2020-08-24 | End: 2020-08-28 | Stop reason: HOSPADM

## 2020-08-24 RX ORDER — SODIUM CHLORIDE 0.9 % (FLUSH) 0.9 %
10 SYRINGE (ML) INJECTION EVERY 12 HOURS SCHEDULED
Status: DISCONTINUED | OUTPATIENT
Start: 2020-08-24 | End: 2020-08-28 | Stop reason: HOSPADM

## 2020-08-24 RX ORDER — ACETAMINOPHEN 325 MG/1
650 TABLET ORAL EVERY 6 HOURS PRN
Status: DISCONTINUED | OUTPATIENT
Start: 2020-08-24 | End: 2020-08-28 | Stop reason: HOSPADM

## 2020-08-24 RX ORDER — PANTOPRAZOLE SODIUM 40 MG/10ML
40 INJECTION, POWDER, LYOPHILIZED, FOR SOLUTION INTRAVENOUS
Status: DISCONTINUED | OUTPATIENT
Start: 2020-08-25 | End: 2020-08-28 | Stop reason: HOSPADM

## 2020-08-24 RX ORDER — 0.9 % SODIUM CHLORIDE 0.9 %
1000 INTRAVENOUS SOLUTION INTRAVENOUS ONCE
Status: COMPLETED | OUTPATIENT
Start: 2020-08-24 | End: 2020-08-24

## 2020-08-24 RX ORDER — SODIUM CHLORIDE 9 MG/ML
INJECTION, SOLUTION INTRAVENOUS CONTINUOUS
Status: DISCONTINUED | OUTPATIENT
Start: 2020-08-24 | End: 2020-08-28 | Stop reason: HOSPADM

## 2020-08-24 RX ADMIN — MORPHINE SULFATE 4 MG: 4 INJECTION, SOLUTION INTRAMUSCULAR; INTRAVENOUS at 12:35

## 2020-08-24 RX ADMIN — IOPAMIDOL 80 ML: 755 INJECTION, SOLUTION INTRAVENOUS at 13:40

## 2020-08-24 RX ADMIN — SODIUM CHLORIDE 1000 ML: 9 INJECTION, SOLUTION INTRAVENOUS at 12:36

## 2020-08-24 RX ADMIN — Medication 10 ML: at 19:44

## 2020-08-24 RX ADMIN — FAMOTIDINE 20 MG: 10 INJECTION, SOLUTION INTRAVENOUS at 12:34

## 2020-08-24 RX ADMIN — SODIUM CHLORIDE: 9 INJECTION, SOLUTION INTRAVENOUS at 17:20

## 2020-08-24 RX ADMIN — ENOXAPARIN SODIUM 40 MG: 40 INJECTION SUBCUTANEOUS at 19:43

## 2020-08-24 RX ADMIN — GADOTERIDOL 20 ML: 279.3 INJECTION, SOLUTION INTRAVENOUS at 19:22

## 2020-08-24 RX ADMIN — HYDROMORPHONE HYDROCHLORIDE 0.5 MG: 1 INJECTION, SOLUTION INTRAMUSCULAR; INTRAVENOUS; SUBCUTANEOUS at 19:43

## 2020-08-24 RX ADMIN — ONDANSETRON 4 MG: 2 INJECTION INTRAMUSCULAR; INTRAVENOUS at 16:31

## 2020-08-24 RX ADMIN — ACETAMINOPHEN 650 MG: 325 TABLET ORAL at 21:51

## 2020-08-24 RX ADMIN — HYDROMORPHONE HYDROCHLORIDE 0.5 MG: 1 INJECTION, SOLUTION INTRAMUSCULAR; INTRAVENOUS; SUBCUTANEOUS at 15:53

## 2020-08-24 ASSESSMENT — PAIN DESCRIPTION - PAIN TYPE
TYPE: ACUTE PAIN

## 2020-08-24 ASSESSMENT — PAIN SCALES - GENERAL
PAINLEVEL_OUTOF10: 8
PAINLEVEL_OUTOF10: 7
PAINLEVEL_OUTOF10: 9
PAINLEVEL_OUTOF10: 10
PAINLEVEL_OUTOF10: 6
PAINLEVEL_OUTOF10: 10

## 2020-08-24 ASSESSMENT — PAIN DESCRIPTION - ONSET
ONSET: ON-GOING
ONSET: ON-GOING

## 2020-08-24 ASSESSMENT — PAIN DESCRIPTION - PROGRESSION
CLINICAL_PROGRESSION: GRADUALLY WORSENING
CLINICAL_PROGRESSION: NOT CHANGED

## 2020-08-24 ASSESSMENT — PAIN DESCRIPTION - DESCRIPTORS
DESCRIPTORS: CRAMPING;SHARP
DESCRIPTORS: ACHING
DESCRIPTORS: ACHING;CRAMPING
DESCRIPTORS: ACHING;CRAMPING;STABBING

## 2020-08-24 ASSESSMENT — PAIN DESCRIPTION - LOCATION
LOCATION: ABDOMEN
LOCATION: ABDOMEN
LOCATION: BACK
LOCATION: ABDOMEN
LOCATION: ABDOMEN

## 2020-08-24 ASSESSMENT — PAIN DESCRIPTION - DIRECTION: RADIATING_TOWARDS: ANTERIOR TO POSTERIOR

## 2020-08-24 ASSESSMENT — PAIN DESCRIPTION - FREQUENCY
FREQUENCY: CONTINUOUS
FREQUENCY: CONTINUOUS

## 2020-08-24 ASSESSMENT — PAIN - FUNCTIONAL ASSESSMENT
PAIN_FUNCTIONAL_ASSESSMENT: PREVENTS OR INTERFERES SOME ACTIVE ACTIVITIES AND ADLS
PAIN_FUNCTIONAL_ASSESSMENT: PREVENTS OR INTERFERES SOME ACTIVE ACTIVITIES AND ADLS

## 2020-08-24 ASSESSMENT — PAIN DESCRIPTION - ORIENTATION
ORIENTATION: LOWER
ORIENTATION: MID

## 2020-08-24 NOTE — ED PROVIDER NOTES
EMERGENCY DEPARTMENT ENCOUNTER     CHIEF COMPLAINT   Chief Complaint   Patient presents with    Abdominal Pain        HPI   Luigi Hendricks is a 44 y.o. female who presents with severe upper and diffuse abdominal pain, onset was 3-4 days ago, after getting carafate for outpt treatment for presumed PUD, but she's not getting better. She is worried about her hernia acting up, which was formerly repaired by Dr. Ruth Russell. The duration has been constant since the onset. The patient has no associated rectal bleeding or fever. She did have a good bowel movement yesterday. She feels more bloated. There are no alleviating factors. The context is that the symptoms started spontaneously, without any known precipitants. Pt also had a remote hx of splenectomy, and when she had her cholecystectomy, she was told by her surgeon that the GB was wrapped in blood vessels. As far as she knows, she does not have a hx of varices or portal hypertension or liver cirrhosis or any immunodeficiency. REVIEW OF SYSTEMS   GI: See history of present illness   Cardiac: No chest pain or syncope   Pulmonary: No difficulty breathing or new cough   General: No fevers   : No hematuria or dysuria   See HPI for further details. All other review of systems are reviewed and are otherwise negative.      PAST MEDICAL & SURGICAL HISTORY   Past Medical History:   Diagnosis Date    Hx of blood clots 2012    LUNGS    Pancreatitis     PE (pulmonary embolism) 2012    Platelets decreased [facility])       Past Surgical History:   Procedure Laterality Date    CHOLECYSTECTOMY  21 Nov 2014    Cholecystectomy Laparoscopic, converted to open (Dr. Ruth Russell, [facility])    ENDOMETRIAL ABLATION  2012    HERNIA REPAIR  12/16/2016    LAPAROSCOPIC VENTRAL WITH MESH    SPLENECTOMY  3-27-15    [facility]    TUBAL LIGATION  2012        CURRENT MEDICATIONS   Current Outpatient Rx   Medication Sig Dispense Refill    sucralfate (CARAFATE) 1 GM tablet Take 1 tablet by mouth 4 times daily 120 tablet 0    omeprazole (PRILOSEC) 40 MG delayed release capsule Take 1 capsule by mouth daily 30 capsule 2    Probiotic Product (PROBIOTIC PO) Take by mouth daily      furosemide (LASIX) 20 MG tablet Take 1 tablet by mouth daily as needed 30 tablet 1        ALLERGIES   Allergies   Allergen Reactions    Ceclor [Cefaclor] Rash        SOCIAL AND FAMILY HISTORY   Social History     Socioeconomic History    Marital status:      Spouse name: Not on file    Number of children: Not on file    Years of education: Not on file    Highest education level: Not on file   Occupational History     Employer: [employer]     Comment: [employer] [address] [facility] Needs    Financial resource strain: Not on file    Food insecurity     Worry: Not on file     Inability: Not on file    Transportation needs     Medical: Not on file     Non-medical: Not on file   Tobacco Use    Smoking status: Never Smoker    Smokeless tobacco: Never Used   Substance and Sexual Activity    Alcohol use: Yes     Comment: occasional    Drug use: No    Sexual activity: Yes     Partners: Male   Lifestyle    Physical activity     Days per week: Not on file     Minutes per session: Not on file    Stress: Not on file   Relationships    Social connections     Talks on phone: Not on file     Gets together: Not on file     Attends Anabaptist service: Not on file     Active member of club or organization: Not on file     Attends meetings of clubs or organizations: Not on file     Relationship status: Not on file    Intimate partner violence     Fear of current or ex partner: Not on file     Emotionally abused: Not on file     Physically abused: Not on file     Forced sexual activity: Not on file   Other Topics Concern    Not on file   Social History Narrative    Not on file      Family History   Problem Relation Age of Onset    High Blood Pressure Father     Heart Disease Father     High Blood Pressure Brother     Cancer Neg Hx     Diabetes Neg Hx     Stroke Neg Hx         PHYSICAL EXAM   VITAL SIGNS: /71   Pulse 83   Temp 98.3 °F (36.8 °C)   Resp 18   SpO2 98%    Constitutional: Well developed, well nourished, moderate distress   Eyes: Sclera nonicteric, conjunctiva moist   HENT: Atraumatic, nose normal   Neck: Supple, no JVD   Respiratory: No retractions, no accessory muscle use, normal breath sounds   Cardiovascular: Normal rate, normal rhythm, no murmurs   GI: Soft, moderate diffuse abdominal tenderness, no guarding, bowel sounds present (no tympany noted), no audible bruits or palpable pulsatile masses   Musculoskeletal: No edema, no deformity   Vascular: DP pulses 2+ equal bilaterally   Integument: No rash, dry skin   Neurologic: Alert & oriented, normal speech   Psychiatric: Cooperative, pleasant affect     RADIOLOGY/PROCEDURES        LABS   Labs Reviewed   CBC WITH AUTO DIFFERENTIAL - Abnormal; Notable for the following components:       Result Value    MCV 99.1 (*)     MCH 33.6 (*)     RDW-SD 45.9 (*)     Monocytes Absolute 1.4 (*)     All other components within normal limits   COMPREHENSIVE METABOLIC PANEL W/ REFLEX TO MG FOR LOW K - Abnormal; Notable for the following components:     (*)     Alkaline Phosphatase 251 (*)     Total Bilirubin 1.9 (*)      (*)     All other components within normal limits   OSMOLALITY - Abnormal; Notable for the following components:    Osmolality Calc 272.6 (*)     All other components within normal limits   LIPASE   AMYLASE   ANION GAP   GLOMERULAR FILTRATION RATE, ESTIMATED   URINALYSIS        ED COURSE & MEDICAL DECISION MAKING   Pertinent Labs & Imaging studies reviewed and interpreted.  (See chart for details)   See EMR for medications prescribed   Vitals:    08/24/20 1352   BP: 137/71   Pulse: 83   Resp: 18   Temp:    SpO2: 98%        Differential diagnosis: Pancreatitis, non-specific abdominal pain, Ischemic Bowel, Bowel Obstruction, Acute Cholecystitis, Acute Appendicitis, other     FINAL IMPRESSION   1. Idiopathic acute pancreatitis with uninfected necrosis    2. Generalized abdominal pain         PLAN   MDM - concern for small bowel obstruction. Pt is a pt of Dr. Julián Foster from previously for ventral hernia repair with mesh. Pt will be admitted to hospital medicine for further GI or surgical evaluation. Pt will need another dose of pain medications. Pt understood the plan for admission and understands the need for further inpt management and consultation.     Electronically signed by: Jessenia Sanchez MD, 8/24/2020 3:19 PM   (This note was completed with a voice recognition program)         Latesha Jimenez MD  08/24/20 7110

## 2020-08-24 NOTE — H&P
History & Physical        Patient:  Nessa Celestin  YOB: 1980    MRN: [id number]     Acct: [de-identified]    PCP: Jaylyn Marino MD    Date of Admission: 8/24/2020    Date of Service: Pt seen/examined on 8/24/2020   and Admitted to Inpatient with expected LOS greater than two midnights due to medical therapy. Chief Complaint:  Abdominal pain       History Of Present Illness:      44 y.o. female who presented to [address] [place] [address] with abdominal pain. Pt reports epigastric abdominal pain that began last week Monday, sharp like in nature and 10/10. Was not associated with food intake. No nausea or vomiting. The pt visited her PCP the next day, of which he gave her PPI and Carafate for possible ulcer. Symptoms improved for two days, however over the weekend abdominal pain came back, this time radiated straight to her back and to the back of her shoulders. Not associated with any food intake, nausea, vomiting, fevers or chills. Pt does endorse some sob. Pt denies any cough, congestion, sore throat, chest pain, blood in stool/urine. 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 Juliana Neil. Pt also has history of prior DVT in 2012? In the ED, HD stable. Labs unremarkable. PLT 295K. Hb 14.4. WBC 7.9. LFT's elevated. CT A/P showing hypoattenuation within the pancreas could also reflect pancreatic necrosis. Given patient's age pancreatic necrosis is thought to be more likely. Overall pancreas is enlarged. Clinical correlation for etiology of pancreatitis is recommended. Further evaluation may be clinically warranted. 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 Multiple collateral vessels suggest that this is a chronic process.  There is also probable thrombosis of a varicose vein superior to the pancreas versus lymph node. Past Medical History:          Diagnosis Date    Hx of blood clots 2012    LUNGS    Pancreatitis     PE (pulmonary embolism) 2012    Platelets decreased [facility])        Past Surgical History:          Procedure Laterality Date    CHOLECYSTECTOMY  21 Nov 2014    Cholecystectomy Laparoscopic, converted to open ([facility] Gege Price, [facility])    ENDOMETRIAL ABLATION  2012    HERNIA REPAIR  12/16/2016    LAPAROSCOPIC VENTRAL WITH MESH    SPLENECTOMY  3-27-15    [facility]    TUBAL LIGATION  2012       Medications Prior to Admission:      Prior to Admission medications    Medication Sig Start Date End Date Taking?  Authorizing Provider   sucralfate (CARAFATE) 1 GM tablet Take 1 tablet by mouth 4 times daily 8/19/20   Nanci Campbell MD   omeprazole (PRILOSEC) 40 MG delayed release capsule Take 1 capsule by mouth daily 8/19/20   Nanci Campbell MD   Probiotic Product (PROBIOTIC PO) Take by mouth daily    Historical Provider, MD   furosemide (LASIX) 20 MG tablet Take 1 tablet by mouth daily as needed 3/11/16   Paulette Farr MD       Allergies:  Ceclor [cefaclor]    Social History:     Social History     Socioeconomic History    Marital status:      Spouse name: Not on file    Number of children: Not on file    Years of education: Not on file    Highest education level: Not on file   Occupational History     Employer: [employer]     Comment: [id number] Financial resource strain: Not on file    Food insecurity     Worry: Not on file     Inability: Not on file   [employer] needs     Medical: Not on file     Non-medical: Not on file   Tobacco Use    Smoking status: Never Smoker    Smokeless tobacco: Never Used   Substance and Sexual Activity    Alcohol use: Yes     Comment: occasional    Drug use: No    Sexual activity: Yes     Partners: Male   Lifestyle    Physical activity     Days per week: Not on file     Minutes per session: Not on file    Stress: Not on file   Relationships    Social connections     Talks on phone: Not on file     Gets together: Not on file     Attends Adventism service: Not on file     Active member of club or organization: Not on file     Attends meetings of clubs or organizations: Not on file     Relationship status: Not on file    Intimate partner violence     Fear of current or ex partner: Not on file     Emotionally abused: Not on file     Physically abused: Not on file     Forced sexual activity: Not on file   Other Topics Concern    Not on file   Social History Narrative    Not on file       Family History:       Reviewed in detail and negative for DM, CAD, Cancer, CVA. Positive as follows:        Problem Relation Age of Onset    High Blood Pressure Father     Heart Disease Father     High Blood Pressure Brother     Cancer Neg Hx     Diabetes Neg Hx     Stroke Neg Hx        Diet:  No diet orders on file    REVIEW OF SYSTEMS:   Pertinent positives as noted in the HPI. All other systems reviewed and negative. PHYSICAL EXAM:    /71   Pulse 83   Temp 98.3 °F (36.8 °C)   Resp 18   SpO2 98%     General appearance:  No apparent distress, appears stated age and cooperative. HEENT:  Normal cephalic, atraumatic without obvious deformity. Pupils equal, round, and reactive to light. Extra ocular muscles intact. Conjunctivae/corneas clear. Neck: Supple, with full range of motion. No jugular venous distention. Trachea midline. Respiratory:  Normal respiratory effort. Clear to auscultation, bilaterally without Rales/Wheezes/Rhonchi. Cardiovascular:  Regular rate and rhythm with normal S1/S2 without murmurs, rubs or gallops. Abdomen: Soft, non-tender, non-distended with normal bowel sounds. Musculoskeletal:  No clubbing, cyanosis or edema bilaterally. Full range of motion without deformity. Skin: Skin color, texture, turgor normal.  No rashes or lesions.   Neurologic:  Neurovascularly intact without any focal sensory/motor deficits. Cranial nerves: II-XII intact, grossly non-focal.  Psychiatric:  Alert and oriented, thought content appropriate, normal insight  Capillary Refill: Brisk,< 3 seconds   Peripheral Pulses: +2 palpable, equal bilaterally       Labs:     Recent Labs     08/24/20  1228   WBC 7.9   HGB 14.4   HCT 42.5        Recent Labs     08/24/20  1228      K 4.2   CL 99   CO2 27   BUN 11   CREATININE 0.6   CALCIUM 9.3     Recent Labs     08/24/20  1228   *   *   BILITOT 1.9*   ALKPHOS 251*     No results for input(s): INR in the last 72 hours. No results for input(s): Anastacia Sophia in the last 72 hours. Urinalysis:      Lab Results   Component Value Date    NITRU NEGATIVE 02/25/2016    WBCUA 25-50 02/25/2016    WBCUA 0-2 10/30/2011    BACTERIA FEW 02/25/2016    RBCUA 0-2 02/25/2016    BLOODU NEGATIVE 02/25/2016    SPECGRAV 1.011 02/25/2016    GLUCOSEU NEGATIVE 11/14/2014       Code Status: Prior      ASSESSMENT:    Active Hospital Problems    Diagnosis Date Noted    Acute pancreatitis [K85.90] 08/24/2020    Acute pancreatitis without infection or necrosis [K85.90] 08/24/2020       Assessment/Plan:    --Abdominal Pain with Acute Pancreatitis with possible necrosis and Thrombosis? 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 Maria A Coles. Pt also has history of prior DVT in 2012? LFT's elevated. Lipase WNL. 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 Multiple collateral vessels suggest that this is a chronic process. There is also probable thrombosis of a varicose vein superior to the pancreas versus lymph node. Pending Liver U/S with dopplers. Pending MRI A/P. IVF. NPO. No evidence of infected pancreas. Consult GI, General Surgery, and Oncology.      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 Follows up with Dr. Sanjeev Caldwell. Will consult Oncology. Pending MRI A/P. Thank you Zoraida Reyes MD for the opportunity to be involved in this patient's care.     Electronically signed by Celio Camarillo MD on 8/24/2020 at 3:29 PM

## 2020-08-24 NOTE — ED TRIAGE NOTES
Patient presents with abdominal pain that radiates to her back. States she has a history of ulcers and hernia repair. States her pain started last Monday.

## 2020-08-25 LAB
ALBUMIN SERPL-MCNC: 3.1 G/DL (ref 3.5–5.1)
ALP BLD-CCNC: 218 U/L (ref 38–126)
ALT SERPL-CCNC: 146 U/L (ref 11–66)
ANION GAP SERPL CALCULATED.3IONS-SCNC: 10 MEQ/L (ref 8–16)
AST SERPL-CCNC: 110 U/L (ref 5–40)
BASOPHILS # BLD: 0.4 %
BASOPHILS ABSOLUTE: 0 THOU/MM3 (ref 0–0.1)
BILIRUB SERPL-MCNC: 1.3 MG/DL (ref 0.3–1.2)
BILIRUBIN DIRECT: 0.6 MG/DL (ref 0–0.3)
BUN BLDV-MCNC: 11 MG/DL (ref 7–22)
CALCIUM IONIZED: 1.13 MMOL/L (ref 1.12–1.32)
CALCIUM SERPL-MCNC: 8.6 MG/DL (ref 8.5–10.5)
CHLORIDE BLD-SCNC: 105 MEQ/L (ref 98–111)
CO2: 24 MEQ/L (ref 23–33)
CREAT SERPL-MCNC: 0.5 MG/DL (ref 0.4–1.2)
EOSINOPHIL # BLD: 1.7 %
EOSINOPHILS ABSOLUTE: 0.1 THOU/MM3 (ref 0–0.4)
ERYTHROCYTE [DISTWIDTH] IN BLOOD BY AUTOMATED COUNT: 12.7 % (ref 11.5–14.5)
ERYTHROCYTE [DISTWIDTH] IN BLOOD BY AUTOMATED COUNT: 12.9 % (ref 11.5–14.5)
ERYTHROCYTE [DISTWIDTH] IN BLOOD BY AUTOMATED COUNT: 48 FL (ref 35–45)
ERYTHROCYTE [DISTWIDTH] IN BLOOD BY AUTOMATED COUNT: 48.3 FL (ref 35–45)
GFR SERPL CREATININE-BSD FRML MDRD: > 90 ML/MIN/1.73M2
GLUCOSE BLD-MCNC: 71 MG/DL (ref 70–108)
HCT VFR BLD CALC: 37.1 % (ref 37–47)
HCT VFR BLD CALC: 39.3 % (ref 37–47)
HEMOGLOBIN: 12.2 GM/DL (ref 12–16)
HEMOGLOBIN: 12.9 GM/DL (ref 12–16)
IMMATURE GRANS (ABS): 0.02 THOU/MM3 (ref 0–0.07)
IMMATURE GRANULOCYTES: 0.2 %
INR BLD: 1.18 (ref 0.85–1.13)
LYMPHOCYTES # BLD: 28.1 %
LYMPHOCYTES ABSOLUTE: 2.3 THOU/MM3 (ref 1–4.8)
MAGNESIUM: 1.7 MG/DL (ref 1.6–2.4)
MCH RBC QN AUTO: 33.2 PG (ref 26–33)
MCH RBC QN AUTO: 33.5 PG (ref 26–33)
MCHC RBC AUTO-ENTMCNC: 32.8 GM/DL (ref 32.2–35.5)
MCHC RBC AUTO-ENTMCNC: 32.9 GM/DL (ref 32.2–35.5)
MCV RBC AUTO: 101 FL (ref 81–99)
MCV RBC AUTO: 101.9 FL (ref 81–99)
MONOCYTES # BLD: 19 %
MONOCYTES ABSOLUTE: 1.5 THOU/MM3 (ref 0.4–1.3)
NUCLEATED RED BLOOD CELLS: 0 /100 WBC
PHOSPHORUS: 3.1 MG/DL (ref 2.4–4.7)
PLATELET # BLD: 270 THOU/MM3 (ref 130–400)
PLATELET # BLD: 279 THOU/MM3 (ref 130–400)
PMV BLD AUTO: 10.1 FL (ref 9.4–12.4)
PMV BLD AUTO: 10.4 FL (ref 9.4–12.4)
POTASSIUM SERPL-SCNC: 3.9 MEQ/L (ref 3.5–5.2)
RBC # BLD: 3.64 MILL/MM3 (ref 4.2–5.4)
RBC # BLD: 3.89 MILL/MM3 (ref 4.2–5.4)
SEG NEUTROPHILS: 50.6 %
SEGMENTED NEUTROPHILS ABSOLUTE COUNT: 4.1 THOU/MM3 (ref 1.8–7.7)
SODIUM BLD-SCNC: 139 MEQ/L (ref 135–145)
TOTAL PROTEIN: 6.3 G/DL (ref 6.1–8)
WBC # BLD: 7 THOU/MM3 (ref 4.8–10.8)
WBC # BLD: 8.1 THOU/MM3 (ref 4.8–10.8)

## 2020-08-25 PROCEDURE — 82330 ASSAY OF CALCIUM: CPT

## 2020-08-25 PROCEDURE — 80053 COMPREHEN METABOLIC PANEL: CPT

## 2020-08-25 PROCEDURE — 84100 ASSAY OF PHOSPHORUS: CPT

## 2020-08-25 PROCEDURE — 94760 N-INVAS EAR/PLS OXIMETRY 1: CPT

## 2020-08-25 PROCEDURE — 85025 COMPLETE CBC W/AUTO DIFF WBC: CPT

## 2020-08-25 PROCEDURE — 6360000002 HC RX W HCPCS: Performed by: INTERNAL MEDICINE

## 2020-08-25 PROCEDURE — 99232 SBSQ HOSP IP/OBS MODERATE 35: CPT | Performed by: INTERNAL MEDICINE

## 2020-08-25 PROCEDURE — C9113 INJ PANTOPRAZOLE SODIUM, VIA: HCPCS | Performed by: INTERNAL MEDICINE

## 2020-08-25 PROCEDURE — 2140000000 HC CCU INTERMEDIATE R&B

## 2020-08-25 PROCEDURE — 82248 BILIRUBIN DIRECT: CPT

## 2020-08-25 PROCEDURE — 99253 IP/OBS CNSLTJ NEW/EST LOW 45: CPT | Performed by: SURGERY

## 2020-08-25 PROCEDURE — 83735 ASSAY OF MAGNESIUM: CPT

## 2020-08-25 PROCEDURE — 85027 COMPLETE CBC AUTOMATED: CPT

## 2020-08-25 PROCEDURE — 36415 COLL VENOUS BLD VENIPUNCTURE: CPT

## 2020-08-25 PROCEDURE — 2580000003 HC RX 258: Performed by: INTERNAL MEDICINE

## 2020-08-25 PROCEDURE — 6370000000 HC RX 637 (ALT 250 FOR IP): Performed by: INTERNAL MEDICINE

## 2020-08-25 PROCEDURE — 85610 PROTHROMBIN TIME: CPT

## 2020-08-25 RX ORDER — HEPARIN SODIUM 10000 [USP'U]/100ML
INJECTION, SOLUTION INTRAVENOUS
Status: DISPENSED
Start: 2020-08-25 | End: 2020-08-26

## 2020-08-25 RX ADMIN — ENOXAPARIN SODIUM 120 MG: 120 INJECTION SUBCUTANEOUS at 20:31

## 2020-08-25 RX ADMIN — HYDROMORPHONE HYDROCHLORIDE 0.5 MG: 1 INJECTION, SOLUTION INTRAMUSCULAR; INTRAVENOUS; SUBCUTANEOUS at 01:34

## 2020-08-25 RX ADMIN — HYDROMORPHONE HYDROCHLORIDE 0.5 MG: 1 INJECTION, SOLUTION INTRAMUSCULAR; INTRAVENOUS; SUBCUTANEOUS at 15:15

## 2020-08-25 RX ADMIN — ENOXAPARIN SODIUM 120 MG: 120 INJECTION SUBCUTANEOUS at 08:42

## 2020-08-25 RX ADMIN — PANTOPRAZOLE SODIUM 40 MG: 40 INJECTION, POWDER, FOR SOLUTION INTRAVENOUS at 08:30

## 2020-08-25 RX ADMIN — ACETAMINOPHEN 650 MG: 325 TABLET ORAL at 20:31

## 2020-08-25 RX ADMIN — SODIUM CHLORIDE: 9 INJECTION, SOLUTION INTRAVENOUS at 22:02

## 2020-08-25 RX ADMIN — ACETAMINOPHEN 650 MG: 325 TABLET ORAL at 08:31

## 2020-08-25 RX ADMIN — Medication 10 ML: at 08:29

## 2020-08-25 RX ADMIN — SODIUM CHLORIDE: 9 INJECTION, SOLUTION INTRAVENOUS at 15:34

## 2020-08-25 RX ADMIN — HYDROMORPHONE HYDROCHLORIDE 0.5 MG: 1 INJECTION, SOLUTION INTRAMUSCULAR; INTRAVENOUS; SUBCUTANEOUS at 22:49

## 2020-08-25 RX ADMIN — SODIUM CHLORIDE: 9 INJECTION, SOLUTION INTRAVENOUS at 01:34

## 2020-08-25 RX ADMIN — HYDROMORPHONE HYDROCHLORIDE 0.5 MG: 1 INJECTION, SOLUTION INTRAMUSCULAR; INTRAVENOUS; SUBCUTANEOUS at 05:15

## 2020-08-25 RX ADMIN — HYDROMORPHONE HYDROCHLORIDE 0.5 MG: 1 INJECTION, SOLUTION INTRAMUSCULAR; INTRAVENOUS; SUBCUTANEOUS at 18:36

## 2020-08-25 RX ADMIN — SODIUM CHLORIDE: 9 INJECTION, SOLUTION INTRAVENOUS at 08:47

## 2020-08-25 RX ADMIN — HYDROMORPHONE HYDROCHLORIDE 0.5 MG: 1 INJECTION, SOLUTION INTRAMUSCULAR; INTRAVENOUS; SUBCUTANEOUS at 08:30

## 2020-08-25 RX ADMIN — ONDANSETRON 4 MG: 2 INJECTION INTRAMUSCULAR; INTRAVENOUS at 01:34

## 2020-08-25 ASSESSMENT — PAIN DESCRIPTION - DESCRIPTORS
DESCRIPTORS: CRAMPING
DESCRIPTORS: CRAMPING;SHARP
DESCRIPTORS: CRAMPING;SHARP
DESCRIPTORS: CONSTANT
DESCRIPTORS: CRAMPING;SHARP
DESCRIPTORS: CRAMPING
DESCRIPTORS: SHARP;STABBING

## 2020-08-25 ASSESSMENT — PAIN DESCRIPTION - PAIN TYPE
TYPE: ACUTE PAIN

## 2020-08-25 ASSESSMENT — PAIN - FUNCTIONAL ASSESSMENT

## 2020-08-25 ASSESSMENT — PAIN SCALES - GENERAL
PAINLEVEL_OUTOF10: 7
PAINLEVEL_OUTOF10: 6
PAINLEVEL_OUTOF10: 9
PAINLEVEL_OUTOF10: 8
PAINLEVEL_OUTOF10: 5
PAINLEVEL_OUTOF10: 8
PAINLEVEL_OUTOF10: 7
PAINLEVEL_OUTOF10: 5
PAINLEVEL_OUTOF10: 0
PAINLEVEL_OUTOF10: 0
PAINLEVEL_OUTOF10: 9
PAINLEVEL_OUTOF10: 8
PAINLEVEL_OUTOF10: 0

## 2020-08-25 ASSESSMENT — PAIN DESCRIPTION - PROGRESSION
CLINICAL_PROGRESSION: GRADUALLY WORSENING
CLINICAL_PROGRESSION: GRADUALLY IMPROVING
CLINICAL_PROGRESSION: GRADUALLY WORSENING
CLINICAL_PROGRESSION: NOT CHANGED

## 2020-08-25 ASSESSMENT — PAIN DESCRIPTION - LOCATION
LOCATION: ABDOMEN

## 2020-08-25 ASSESSMENT — PAIN DESCRIPTION - FREQUENCY
FREQUENCY: CONTINUOUS

## 2020-08-25 ASSESSMENT — PAIN DESCRIPTION - ORIENTATION
ORIENTATION: MID

## 2020-08-25 ASSESSMENT — PAIN DESCRIPTION - DIRECTION
RADIATING_TOWARDS: ANTERIOR TO POSTERIOR

## 2020-08-25 ASSESSMENT — PAIN DESCRIPTION - ONSET
ONSET: ON-GOING

## 2020-08-25 NOTE — PROGRESS NOTES
Hospitalist Progress Note    Patient:  Luigi Hendricks  YOB: 1980  MRN: [id number]   PCP: Maria E Contreras MD        Acct: [de-identified]  Unit/Bed: [address] [place] [address]-A    Date of Admission: 8/24/2020    ASSESSMENT/ PLAN     1. Acute pancreatitis: actual lipase not elevated but on MRCP pancreas appears inflamed with an enlarged and edematous pancreas and peripancreatic and mesenteric edema. IV fluids, NPO, pain control. Check triglycerides. Gastroenterology following. Question of pancreatic necrosis  2. 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 Roseline Romo and decision made to start on anticoagulation. Will likely need hypercoagulable workup. Will check Factor V Leiden, prothrombin, antithrombin , LEWIS, Screening test CA-125 for ovarian cancer as commonly associated with mesenteric vein thrombosis  3. Abnormal LFTS: Continue to trend- thought to be related to #1   4. Morbid obesity: BMI 43  5. History of pulmonary embolism    Anticipated Discharge in : 3 days. Based on my evaluation, patient's clinical picture is most consistent with acute mesenteric vein thrombosis as patient has a picture of ischemic pancreas given the presentation of pain without nausea or vomiting and the pancreatic edema and elevated liver enzymes is likely related to ischemia as the patient's lipase is not elevated. Code Status: Full Code    Electronically signed by Patrick Krishna MD on 8/25/2020 at 6:38 PM      Chief Complaint     Abdominal pain, shortness of breath    SUBJECTIVE     As derived from HPI written by Dr. Pricilla Gibbs    The patient is a 44 y.o. female  With a history of pulmonary embolism, eho \" presented to [address] [place] [address] with abdominal pain. Pt reports epigastric abdominal pain that began last week Monday, sharp like in nature and 10/10.  Was not associated with food intake. No nausea or vomiting. The pt visited her PCP the next day, of which he gave her PPI and Carafate for possible ulcer. Symptoms improved for two days, however over the weekend abdominal pain came back, this time radiated straight to her back and to the back of her shoulders. Not associated with any food intake, nausea, vomiting, fevers or chills. Pt does endorse some sob. Pt denies any cough, congestion, sore throat, chest pain, blood in stool/urine. 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 Leatha Castillo. Pt also has history of prior DVT in 2012? In the ED, HD stable. Labs unremarkable. PLT 295K. Hb 14.4. WBC 7.9. LFT's elevated. CT A/P showing hypoattenuation within the pancreas could also reflect pancreatic necrosis. Given patient's age pancreatic necrosis is thought to be more likely. Overall pancreas is enlarged. Clinical correlation for etiology of pancreatitis is recommended. Further evaluation may be clinically warranted. 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 Multiple collateral vessels suggest that this is a chronic process. There is also probable thrombosis of a varicose vein superior to the pancreas versus lymph node. \"    8/25: Mid abdominal pain improved but still present.  Started on enoxaparin bid per Dr. Vadim Bruner      OBJECTIVE     Medications:  Reviewed    Infusion Medications    heparin (porcine)      sodium chloride 150 mL/hr at 08/25/20 1534     Scheduled Medications    pantoprazole  40 mg Intravenous QAM AC    sodium chloride flush  10 mL Intravenous 2 times per day    enoxaparin  1 mg/kg Subcutaneous Q12H     PRN Meds: sodium chloride flush, acetaminophen **OR** acetaminophen, polyethylene glycol, promethazine **OR** ondansetron, HYDROmorphone    Ins and outs:      Intake/Output Summary (Last 24 hours) at 8/25/2020 1838  Last data filed at 8/25/2020 1401  Gross per 24 hour   Intake 2890.63 ml   Output 1350 ml   Net 1540.63 ml       Physical Examination     BP (!) 136/92   Pulse 75   Temp 98.4 °F (36.9 °C) (Oral)   Resp 18   Ht 5' 7\" (1.702 m)   Wt 274 lb 9.6 oz (124.6 kg)   SpO2 96%   BMI 43.01 kg/m²     General appearance: No apparent distress. HEENT: Extraocular motion intact. Trachea midline. Neck: Supple. Respiratory:  CTA bilaterally without rales/wheezes/rhonchi. Cardiovascular: RRR with normal S1/S2 without murmurs, rubs or gallops. Abdomen: Soft, tender t, non-distended with normal bowel sounds. Musculoskeletal: Patient is moving extremities x 4 spontaneously  Neurologic: Grossly non focal. CN: II-XII intact  Psychiatric: Alert and oriented  Vascular: Dorsalis pedis palpable bilaterally. Radial pulses palpable bilaterally. No peripheral edema  Skin:  No visible rashes or lesions. Labs     Recent Labs     08/24/20  1228 08/25/20  0436   WBC 7.9 8.1   HGB 14.4 12.9   HCT 42.5 39.3    270     Recent Labs     08/24/20  1228 08/25/20  0436    139   K 4.2 3.9   CL 99 105   CO2 27 24   BUN 11 11   CREATININE 0.6 0.5   CALCIUM 9.3 8.6   PHOS  --  3.1     Recent Labs     08/24/20  1228 08/25/20  0436   * 110*   * 146*   BILIDIR  --  0.6*   BILITOT 1.9* 1.3*   ALKPHOS 251* 218*     Recent Labs     08/25/20  0436   INR 1.18*     No results for input(s): Thelda Rough in the last 72 hours.     Urinalysis:      Lab Results   Component Value Date    NITRU NEGATIVE 08/24/2020    WBCUA 10-15 08/24/2020    WBCUA 0-2 10/30/2011    BACTERIA MODERATE 08/24/2020    RBCUA 5-10 08/24/2020    BLOODU MODERATE 08/24/2020    SPECGRAV >1.030 08/24/2020    GLUCOSEU NEGATIVE 11/14/2014       Diagnostic imaging/procedures     Mri Abdomen W Wo Contrast    Result Date: 8/25/2020  PROCEDURE: MRI ABDOMEN W WO CONTRAST CLINICAL INFORMATION: Pancreatitis of unknown etiology and possible pancreatic mass. COMPARISON: Liver ultrasound dated 8/24/2020 and CT abdomen/pelvis dated 8/24/2020. TECHNIQUE: Routine MRI abdomen without and with IV contrast.  Routine MRCP was also performed. The MRCP examination includes 3D reconstructions of the biliary tree and the pancreatic duct. CONTRAST: 20 mL ProHance intravenously. FINDINGS: LIVER: 1. The liver is normal size. 2. There is a lobular contour of the liver. The hepatic parenchymal echogenicity is within normal limits. 3. There is persistent altered patchy irregular enhancement within the lateral segment of the left hepatic lobe. There is no well-defined mass lesion. 4. There is mild intrahepatic biliary dilatation. 5. The main, left and right portal veins are extremely atretic however no thrombus is seen within these vessels. The portal venous complex is not visualized. The portal venous confluence is not visualized and therefore probably thrombosed. There is also thrombosis with the lack of contrast within the superior mesenteric vein. Etiology of the venous thrombosis is indeterminate however may be related to chronic pancreatitis. There is also thrombosis with the lack of contrast within the residual splenic vein. In addition, there are numerous venous collaterals most likely related to the thrombosed vasculature. GALLBLADDER: 1. Cholecystectomy. BILIARY TREE: 1. The common duct is normal size measuring 0.5 cm in transverse dimension. 2. There is no choledocholithiasis. PANCREAS: 1. The pancreas is enlarged and edematous with peripancreatic and mesenteric edema which in the right clinical setting is consistent with acute pancreatitis. There are clustered mass lesions within the pancreatic neck which are intermediate signal intensity on the T2-weighted images and intermediate signal intensity on the fat-sat T1 precontrast images.  These lesions do not enhance on the postcontrast images however appear to connect and have a serpiginous appearance and distribution suggesting the possibility of thrombosed vessels. No additional mass lesion is seen and the pancreatic enhancement is otherwise homogeneous. The pancreatic duct is not dilated and there is no pancreatic divisum. SPLEEN/ADRENAL GLANDS/KIDNEYS: 1. The patient is had a splenectomy. There is a 1.8 cm mass within the splenic bed suggesting a splenule. 2. The bilateral adrenal glands are unremarkable. 3. There is a moderate amount of mesenteric edema/fluid tracking posteriorly to the left. The bilateral kidneys are otherwise unremarkable. BOWEL: 1. The bowel gas pattern is nonobstructive. FREE AIR/FREE FLUID/INFLAMMATION: 1. As previously described. LYMPHADENOPATHY: 1. Given the extensive venous collaterals is difficult to distinguish these from lymphadenopathy however given this limitation there is lymphadenopathy along the gastrohepatic ligament, within the periportal region, adjacent to the pancreatic head and within the periceliac region. Benign and malignant etiologies should be considered. ABDOMINAL AORTA/IVC: Unremarkable. LUNG BASES: Unremarkable. MUSCULOSKELETAL: Unremarkable. OTHER: None. 1. There is a lobular contour of the liver. The hepatic parenchymal echogenicity is within normal limits. There is persistent altered patchy irregular enhancement within the lateral segment of the left hepatic lobe. There is no well-defined mass lesion. There is mild intrahepatic biliary dilatation. The main, left and right portal veins are extremely atretic however no thrombus is seen within these vessels. The portal venous complex is not visualized. The portal venous confluence is not visualized and therefore probably thrombosed. There is also thrombosis with the lack of contrast within the superior mesenteric vein. The etiology of the venous thrombosis is indeterminate however they be related to chronic pancreatitis. There is also thrombosis with the lack of contrast within the residual splenic vein.  In addition, there are numerous venous collaterals most likely related to the thrombosed vasculature. Clinical management is recommended. A follow-up CT examination/MRI examination of the abdomen in one month is recommended to confirm resolution/stability unless other clinical management is indicated. 2. Cholecystectomy. As previously noted there is mild intrahepatic biliary dilatation however the common duct is normal size measuring 0.5 cm in transverse dimension. 3. The pancreas is enlarged and edematous with peripancreatic and mesenteric edema which in the right clinical setting is consistent with acute pancreatitis. There are clustered mass lesions within the pancreatic neck which are intermediate signal intensity on the T2-weighted images and intermediate signal intensity on the fat-sat T1 precontrast images. These lesions do not enhance on the postcontrast images however appear to connect and have a serpiginous appearance and distribution suggesting the possibility of thrombosed vessels. No additional mass lesion is seen and the pancreatic enhancement is otherwise homogeneous. The pancreatic duct is not dilated and there is no pancreatic divisum. Clinical management is recommended. A follow-up CT examination/MRI examination of the abdomen in one month is recommended to confirm resolution/stability unless other clinical management is indicated. 4. Given the extensive venous collaterals is difficult to distinguish these from lymphadenopathy however given this limitation there is lymphadenopathy along the gastrohepatic ligament, within the periportal region, adjacent to the pancreatic head and within the periceliac region. Benign and malignant etiologies should be considered. Clinical management is recommended. A follow-up CT examination/MRI examination of the abdomen in one month is recommended to confirm resolution/stability unless other clinical management is indicated.  **This report has been created using voice recognition software. It may contain minor errors which are inherent in voice recognition technology. ** Final report electronically signed by Dr. Basilio Negron on 8/25/2020 7:07 AM    Ct Abdomen Pelvis W Iv Contrast Additional Contrast? None    Addendum Date: 8/24/2020    **ADDENDUM #1 * Hypoattenuation within the pancreas could also reflect pancreatic necrosis. Given patient's age pancreatic necrosis is thought to be more likely. Overall pancreas is enlarged. Clinical correlation for etiology of pancreatitis is recommended. Further evaluation may be clinically warranted. 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 Multiple collateral vessels suggest that this is a chronic process. There is also probable thrombosis of a varicose vein superior to the pancreas versus lymph node. Findings were discussed with Dr. Addison Vázquez at 2:32 PM on 8/24/2020 Final report electronically signed by Dr. Wallace Hunter on 8/24/2020 2:36 PM ** ORIGINAL REPORT ** PROCEDURE: CT ABDOMEN PELVIS W IV CONTRAST CLINICAL INFORMATION: severe abdominal pain, r/o obstruction . COMPARISON: October 31, 2014 TECHNIQUE: 5 mm axial CT images were obtained through the abdomen and pelvis after the administration of intravenous and oral contrast. Coronal and sagittal reconstructions were obtained. All CT scans at this facility use dose modulation, iterative reconstruction, and/or weight-based dosing when appropriate to reduce radiation dose to as low as reasonably achievable. FINDINGS: Lung bases are clear. There is a duodenal diverticulum. Spleen is not well seen and may be shrunken with small splenule in the left upper quadrant versus surgically removed. There is hypoattenuation the mid body of the pancreas concerning for underlying lesion area measures approximately 2 x 2 centimeters. Second possible cyst/hypoattenuation in the distal body measuring 3.5 x 1.2 cm.  There is peripancreatic removed. There is hypoattenuation the mid body of the pancreas concerning for underlying lesion area measures approximately 2 x 2 centimeters. Second possible cyst/hypoattenuation in the distal body measuring 3.5 x 1.2 cm. There is peripancreatic infiltration and  edema concerning for superimposed pancreatitis. Correlation with pancreatic serology is advised. Underlying neoplasm is not excluded. Clinical correlation is advised. There are low attenuation superior to the pancreas which could be an abnormal 2.1 x 2 cm lymph node versus prominent varices. . Cholecystectomy. Mild biliary dilatation likely postsurgical. The main portal vein is somewhat diminutive and vessels in the sam hepatis may be collateral flow. There is mesenteric infiltration and edema. There are retroperitoneal lymph nodes as well as varices. Kidneys are symmetric without hydronephrosis. Adrenal glands are unremarkable. There is moderate stool throughout the colon. Correlate for constipation. There is an ovoid fatty lesion measuring 1.6 x 4 just deep to the anterior abdominal wall and to the right of midline nonspecific. There is prior hernia mesh repair with recurrent or persistent fat-containing anterior abdominal wall hernia. Scattered stool in the colon. There is no bowel wall thickening or obstruction. Diverticulosis without evidence for diverticulitis. Normal appendix with small calcified appendicolith. No bladder wall thickening. Trace fluid in the pelvis. No acute osseous findings. 1. There is hypoattenuation the mid body of the pancreas concerning for underlying lesion area measures approximately 2 x 2 centimeters. Second possible cyst/hypoattenuation in the distal body measuring 3.5 x 1.2 cm. There is peripancreatic infiltration and edema concerning for superimposed pancreatitis. Correlation with pancreatic serology is advised. Underlying neoplasm is not excluded. Clinical correlation is advised.  Nonemergent abdominal MRI for complete assessment can be performed as clinically warranted. 2. There are low attenuation superior to the pancreas which could be an abnormal 2.1 x 2 cm lymph node versus prominent varices. There is mesenteric infiltration and edema. There are retroperitoneal lymph nodes as well as varices. 3. Moderate scattered stool in the colon with diverticulosis. No evidence for diverticulitis at this time. **This report has been created using voice recognition software. It may contain minor errors which are inherent in voice recognition technology. ** Final report electronically signed by Dr. Kimberly Agustin on 8/24/2020 2:16 PM    Us Liver    Result Date: 8/24/2020  PROCEDURE: US LIVER, US DUP ABD PEL RETRO SCROT COMPLETE CLINICAL INFORMATION: also get dopplers of portal vein. COMPARISON: CT abdomen pelvis August 24, 2020. TECHNIQUE: Multiplanar sonographic images were obtained of the liver. FINDINGS: Suboptimal evaluation of the pancreas. Heterogeneous attenuation of the liver. Correlation with LFTs is advised. Portal vein is not well seen and is likely chronically thrombosed. Prominent hepatic arteries. Documented flow in the hepatic veins. Common bile duct was not well seen. IMPRESSION: Suboptimal evaluation of the hepatic vasculature due to multiple collateral vessels and likely chronic thrombosis of the portal vein. Heterogeneous attenuation of the liver. Correlation with LFTs is advised. **This report has been created using voice recognition software. It may contain minor errors which are inherent in voice recognition technology. ** Final report electronically signed by Dr. Kimberly Agustin on 8/24/2020 6:20 PM    Us Dup Abd Pel Retro Scrot Complete    Result Date: 8/24/2020  PROCEDURE: US LIVER, US DUP ABD PEL RETRO SCROT COMPLETE CLINICAL INFORMATION: also get dopplers of portal vein. COMPARISON: CT abdomen pelvis August 24, 2020. TECHNIQUE: Multiplanar sonographic images were obtained of the liver.  FINDINGS: Suboptimal evaluation of the pancreas. Heterogeneous attenuation of the liver. Correlation with LFTs is advised. Portal vein is not well seen and is likely chronically thrombosed. Prominent hepatic arteries. Documented flow in the hepatic veins. Common bile duct was not well seen. IMPRESSION: Suboptimal evaluation of the hepatic vasculature due to multiple collateral vessels and likely chronic thrombosis of the portal vein. Heterogeneous attenuation of the liver. Correlation with LFTs is advised. **This report has been created using voice recognition software. It may contain minor errors which are inherent in voice recognition technology. ** Final report electronically signed by Dr. Ottoniel Stahl on 8/24/2020 6:20 PM      DVT prophylaxis: [x] Lovenox         Disposition:    [x] Home       [] TCU       [] Rehab       [] Psych       [] SNF       [] [facility]       [] Other-

## 2020-08-25 NOTE — PROGRESS NOTES
Pt admitted to  [phone] in a wheelchair. Complaints: abdominal pain. IV none infusing into the forearm left, condition patent and no redness at   . IV site free of s/s of infection or infiltration. Vital signs obtained. Assessment and data collection initiated. Two nurse skin assessment performed by Luz Marina Cintron and Avtar Cardenas RN. Oriented to room. Policies and procedures for 3[facility] explained. Sam Levy RN discussed hourly rounding with patient addressing 5 P's. Fall prevention and safety brochure discussed with patient. Bed alarm on. Call light in reach. The best day to schedule a follow up Dr appointment is:  Monday p.m. Explained patients right to have family, representative or physician notified of their admission. Patient has Declined for physician to be notified. Patient has Declined for family/representative to be notified. All questions answered with no further questions at this time.

## 2020-08-25 NOTE — CARE COORDINATION
20, 7:40 AM EDT  DISCHARGE PLANNING EVALUATION:    [address] [place]       Admitted from: ED 2020/ [facility],Third Floor day: 1   Location: --A Reason for admit: Acute pancreatitis without infection or necrosis [K85.90] Status: IP  Admit order signed?: yes  PMH:  has a past medical history of Hx of blood clots, Pancreatitis, PE (pulmonary embolism), and Platelets decreased ([address] Procedure: None  Pertinent abnormal Imagin/24 CT abd/pelvis - concerning for pancreatic lesion. Second possible cyst/hypoattenuation in the distal body measuring 3.5 x 1.2 cm. There is peripancreatic infiltration and edema concerning for superimposed pancreatitis. There are low attenuation superior to the pancreas which could be an abnormal 2.1 x 2 cm lymph node versus prominent varices. There is mesenteric infiltration and edema. There are retroperitoneal lymph nodes as well as varices.  MRCP - Pancreatitis. Medications:  Scheduled Meds:   pantoprazole  40 mg Intravenous QAM AC    sodium chloride flush  10 mL Intravenous 2 times per day    enoxaparin  1 mg/kg Subcutaneous Q12H     Continuous Infusions:   sodium chloride 150 mL/hr at 20 0134      Pertinent Info/Orders/Treatment Plan:  Admitted through ED with abdominal pain. Found to have pancreatitis. Consulting GI, Surgery and Hematology/Oncology. Albumin 3.8 and 3.1, Alk Phos 251 and 218,  and 146, Amylase 37, Lipase 15.7,  and 110, bilirubin 1.9 and 1.3, total protein 7.2 and 6.3. UA moderate blood, Ketones 15, moderate bacteria. Started Protonix iv daily, Lovenox. IVF. Pain control. Diet: Diet NPO Effective Now   Smoking status:  reports that she has never smoked.  She has never used smokeless tobacco.   PCP: Maria E Contreras MD  Readmission 30 days or less: No  Readmission Risk Score: 8%    Discharge Planning Evaluation  Current Residence:  Private Residence  Living Arrangements:  Spouse/Significant Other, Children   Support Systems: Family Members, Children, Spouse/Significant Other, Parent  Current Services PTA:     Potential Assistance Needed:  N/A  Potential Assistance Purchasing Medications:  No  Does patient want to participate in local refill/ meds to beds program?  No  Type of Home Care Services:  None  Patient expects to be discharged to:  home w/ spouse  Expected Discharge date:  08/25/20  Follow Up Appointment: Best Day/ Time: Tuesday AM    Patient Goals/Plan/Treatment Preferences: Spoke with pt and . They live at home together with 2 daughters. She is independent and drives. She is current with her PCP. Pt states she can afford medications. Denies any DME or HH needs. Transportation/Food Security/Housekeeping Addressed:  No issues identified.     Evaluation: no

## 2020-08-25 NOTE — CONSULTS
[address] [place] [address]                                  CONSULTATION    PATIENT NAME: Quinton Mcdermott                   :        1980  MED REC NO:   [id number]                           ROOM:       0021  ACCOUNT NO:   [de-identified]                           ADMIT DATE: 2020  PROVIDER:     Matt Nielson. Opal Olivas DATE:  2020    REASON FOR CONSULTATION:  History of ITP. The patient may have portal  vein thrombosis at this point. HISTORY OF PRESENT ILLNESS:  The patient is a 78-year-old white female  who I have not seen for over three years. Last time I saw her was  2017. At that point, she was resolved of her thrombocytopenia. The  patient has never had a blood clot according to any of my records. She  also was given a no-show letter in 2018 when she was supposed to  follow up, and she has not followed up with me since. Now, I am called  to see her three years later because she has pancreatitis with possible  thrombosis, possible, there is nothing proven yet. MRI is still pending  of her portal vein system, but it seems to be chronic because there is  dilation of collateral flow. She presented to the ER today with  abdominal pain that began last week. It was sharp, 10/10, was not able  to take in any food, but she had no nausea or vomiting. She saw her  primary care physician who gave her a PPI and Carafate for possible  ulcer. Symptoms improved for few days and then, the patient became  symptomatic with abdominal pain again, this time radiating straight to  her back and her shoulders. The patient underwent a CT scan of the  abdomen in the emergency department that showed hypoattenuation within  the pancreas that could be pancreatic necrosis from a pancreatic  pseudocyst most likely and she has overall pancreatic enlargement for  which she has probably pancreatitis.   The patient was seen by me in the  past hypersplenism for which she underwent a splenectomy for ITP which  worked. Her platelet count has been normal ever since. The  pancytopenia that she was experiencing also resolved with the removal of  her spleen back in 2015. PAST MEDICAL HISTORY:  Significant for PE, hypertension, GERD,  pancreatitis, obesity, splenomegaly, and pancytopenia. PAST SURGICAL HISTORY:  Significant for tubal ligation, endometrial  ablation, splenectomy in 2015. ALLERGIES:  Elgie Frida. SOCIAL HISTORY:  Nonsmoker. No illicit drug use as far as I know. Denies alcohol or tobacco.    FAMILY HISTORY:  There is no history of any cancer in the family. REVIEW OF SYSTEMS:  For this patient during admission is that she was  having issues with abdominal pain that goes straight through to her  back. No nausea or vomiting. No change in bowel habits. No diarrhea. No associated trouble with food intake, fevers, or chills. Some  shortness of breath. Denies cough, congestion, dyspnea, or chest pain. Rest of her review of systems is negative. PHYSICAL EXAMINATION:  I did not do a physical as I am doing this by  telemedicine because of the COVID-19 pandemic. Vital Signs:  Her vitals  signs were great, actually. Temperature is 98.3, pulse 83, blood  pressure 131/71, respirations 18, unlabored. 98% room air sats. According to the internist's physical exam, there really was nothing  much noticed on her physical.  Even, her abdomen seems to be nontender,  nondistended. LABORATORY DATA:  The laboratory data for the patient is as follows:   Her chemistry panel:  Sodium 137, potassium 4.2, chloride 91, CO2 of 27. BUN 11. Creatinine 0.6. Calcium 9.3. Albumin 3.8, alk phos 251, ALT  205, . Amylase 37. Bilirubin is 1.9. Glucose 87.   Her blood  count is totally normal, 7,900 white count, with 14.4 hemoglobin, 42.5  hematocrit, and platelet count 746,672 with a normal differential.    RADIOGRAPHIC DATA: The CT of the abdomen and pelvis 08/24/2020 was as  aforementioned, that she had a 2 x 2 cm hypoattenuation in mid-body of  the pancreas that is concerning for necrosis. She has a second possible  cyst or hypoattenuation in the distal body of 3.5 x 1.2 cm,  peripancreatic infiltration and edema concerning for superimposed  pancreatitis, possible pancreatic pseudocyst.  She has biliary  dilatation post surgically. The patient had moderate scattered stool in  the colon. There are varices noted in the intestinal area. The  impression is now there are too many _____ on the CT scan. The abdomen  and pelvis MRI may be of more use. She had an ultrasound done of her  liver that was unremarkable other than the fact that she had a  suboptimal evaluation of her liver vasculature due to multiple  collateral vessels and chronic thrombosis of the portal vein,  heterogeneous _____. IMPRESSION:  Right now is the patient has a history of ITP which is now  resolved. From a Hematology standpoint, I need to know if she really does have  thrombosis of her portal veins. She can be on Lovenox if so on 1 mg/kg  dosing subcu every 12 hours; however, I think this is a chronic problem. I do not think it is acute. The pancreatitis issue was more of her  problem right now which is not in my realm. The patient does not need  anything from me from a Hematology standpoint. Her ITP is resolved  after splenectomy. People normally get an enlarged liver after  splenectomy anyway. If any questions, just give me a call please.         Davidson Odom D.O.    D: 08/24/2020 20:23:48       T: 08/24/2020 22:30:14     DENISHA  Job#: [id number]     Doc#: [id number]    CC:

## 2020-08-25 NOTE — CONSULTS
is indicated.         2. Cholecystectomy. As previously noted there is mild intrahepatic biliary dilatation however the common duct is normal size measuring 0.5 cm in transverse dimension.         3. The pancreas is enlarged and edematous with peripancreatic and mesenteric edema which in the right clinical setting is consistent with acute pancreatitis. There are clustered mass lesions within the pancreatic neck which are intermediate signal    intensity on the T2-weighted images and intermediate signal intensity on the fat-sat T1 precontrast images. These lesions do not enhance on the postcontrast images however appear to connect and have a serpiginous appearance and distribution suggesting    the possibility of thrombosed vessels. No additional mass lesion is seen and the pancreatic enhancement is otherwise homogeneous. The pancreatic duct is not dilated and there is no pancreatic divisum. Clinical management is recommended. A follow-up CT    examination/MRI examination of the abdomen in one month is recommended to confirm resolution/stability unless other clinical management is indicated.         4. Given the extensive venous collaterals is difficult to distinguish these from lymphadenopathy however given this limitation there is lymphadenopathy along the gastrohepatic ligament, within the periportal region, adjacent to the pancreatic head and    within the periceliac region. Benign and malignant etiologies should be considered. Clinical management is recommended. A follow-up CT examination/MRI examination of the abdomen in one month is recommended to confirm resolution/stability unless other    clinical management is indicated. HISTORY OF PRESENT ILLNESS     patient had epigastric pain and was started on Omeprazole and carafate by PCP. Then , pain a couple days later was periumbilical radiating to the back. She came to ER as directed by PCP. SHe has been getting dilaudid. She has no nausea or vomiting. PROBLEM LIST    does not have any pertinent problems on file. PAST MEDICAL HISTORY     has a past medical history of Hx of blood clots, Pancreatitis, PE (pulmonary embolism), and Platelets decreased ([address] PAST SURGICAL HISTORY      has a past surgical history that includes Tubal ligation (2012); Endometrial ablation (2012); Cholecystectomy (21 Nov 2014); Splenectomy (3-27-15); hernia repair (12/16/2016); and fracture surgery. LABS:    CBC:   Recent Labs     08/24/20  1228 08/25/20  0436   WBC 7.9 8.1   HGB 14.4 12.9    270     BMP:    Recent Labs     08/24/20  1228 08/25/20  0436    139   K 4.2 3.9   CL 99 105   CO2 27 24   BUN 11 11   CREATININE 0.6 0.5   GLUCOSE 87 71     Hepatic:   Recent Labs     08/24/20  1228 08/25/20  0436   ALKPHOS 251* 218*   * 146*   * 110*   PROT 7.2 6.3   BILITOT 1.9* 1.3*   BILIDIR  --  0.6*   LABALBU 3.8 3.1*     Amylase and Lipase:  Recent Labs     08/24/20  1228   LIPASE 15.7   AMYLASE 37     Lactic Acid: No results for input(s): LACTA in the last 72 hours. Calcium:  Recent Labs     08/25/20  0436   CALCIUM 8.6     Ionized Calcium:No results for input(s): IONCA in the last 72 hours. Magnesium:  Recent Labs     08/25/20  0436   MG 1.7     Phosphorus:  Recent Labs     08/25/20  0436   PHOS 3.1       Troponin: No results for input(s): CKTOTAL, CKMB, TROPONINI in the last 72 hours. PT/INR:     Recent Labs     08/25/20  0436   INR 1.18*         REVIEW OF SYSTEMS:    GENERAL:  No fever, chills or weight loss. EYES:  No  blurred vision, double vision  CARDIOVASCULAR:  No chest pain or palpitations. RESPIRATORY:  No dyspnea or cough. GI: abdominal pain   MUSCULOSKELETAL:  No new painful or swollen joints or myalgias. :   No dysuria or hematuria. SKIN:  No rashes or jaundice. NEUROLOGIC:   No headaches or  seizures  PSYCH:  No anxiety or depression. ENDOCRINE:   No polyuria or polydipsia.     BLOOD:  No anemia, bleeding disorder        PHYSICAL EXAMINATION:      Vitals:    08/25/20 0815   BP: (!) 149/91   Pulse: 74   Resp: 18   Temp: 98.4 °F (36.9 °C)   SpO2: 97%     GEN: Well nourished, well developed. LUNGS:   Chest rises equally on inspiration. CARDIOVASCULAR: on tele. ABDOMEN:  Soft, periumbilical tender     EYES: NARESH. ENT:  Ears symmetric, Neck supple, trachea midline, moist mucous membranes     EXTREMITIES:  No cyanosis, clubbing, or edema. (Upper)   DERM:  No rash or jaundice. NEURO:  Alert and oriented x4. Patient moves all extremities and has gross sensation in all extremities. PSYCHIATRIC: calm, pleasant    HOME MEDICATIONS      Prior to Admission medications    Medication Sig Start Date End Date Taking? Authorizing Provider   sucralfate (CARAFATE) 1 GM tablet Take 1 tablet by mouth 4 times daily 8/19/20  Yes Alexis Salazar MD   omeprazole (PRILOSEC) 40 MG delayed release capsule Take 1 capsule by mouth daily 8/19/20  Yes Alexis Salazar MD   Probiotic Product (PROBIOTIC PO) Take by mouth daily   Yes Historical Provider, MD   furosemide (LASIX) 20 MG tablet Take 1 tablet by mouth daily as needed 3/11/16   Maegan Kramer MD       MEDICATIONS    Scheduled Meds:   pantoprazole  40 mg Intravenous QAM AC    sodium chloride flush  10 mL Intravenous 2 times per day    enoxaparin  1 mg/kg Subcutaneous Q12H     Continuous Infusions:   sodium chloride 150 mL/hr at 08/25/20 0847     PRN Meds:.sodium chloride flush, acetaminophen **OR** acetaminophen, polyethylene glycol, promethazine **OR** ondansetron, HYDROmorphone    ALLERGIES   is allergic to ceclor [cefaclor].     SOCIAL HISTORY    Social History  Social History     Socioeconomic History    Marital status:      Spouse name: Farrah Eduardo Number of children: 2    Years of education: None    Highest education level: None   Occupational History     Employer: [email or web address]     Comment: Dar[address]    Social Needs  Financial resource strain: None    Food insecurity     Worry: None     Inability: None    Transportation needs     Medical: None     Non-medical: None   Tobacco Use    Smoking status: Never Smoker    Smokeless tobacco: Never Used   Substance and Sexual Activity    Alcohol use: Yes     Comment: occasional    Drug use: No    Sexual activity: Yes     Partners: Male   Lifestyle    Physical activity     Days per week: None     Minutes per session: None    Stress: None   Relationships    Social connections     Talks on phone: None     Gets together: None     Attends Cheondo[employer] service: None     Active member of club or organization: None     Attends meetings of clubs or organizations: None     Relationship status: None    Intimate partner violence     Fear of current or ex partner: None     Emotionally abused: None     Physically abused: None     Forced sexual activity: None   Other Topics Concern    None   Social History Narrative    None       FAMILY HISTORY    family history includes Heart Disease in her father; High Blood Pressure in her brother and father. REVIEW OF DIAGNOSTIC TESTING AND LABS:        Hospitalist/Attending provider notes, consulting physician notes, laboratory results and procedure notes reviewed prior to seeing the patient. Note done in collaboration with DR Marian Thompson.    Electronically signed by ELEAZAR Smiley CNP on 8/25/20 at 9:00 AM EDT

## 2020-08-25 NOTE — PLAN OF CARE
Problem: Pain:  Goal: Pain level will decrease  Description: Pain level will decrease  Outcome: Ongoing  Note: Ongoing assessment & interventions provided throughout shift. Reminded patient to report any pain to the nurse. Pain medications provided per physician's orders. Will continue to monitor and treat pain PRN. Goal: Control of acute pain  Description: Control of acute pain  Outcome: Ongoing  Goal: Control of chronic pain  Description: Control of chronic pain  Outcome: Ongoing     Problem: Falls - Risk of:  Goal: Will remain free from falls  Description: Will remain free from falls  Outcome: Met This Shift  Note: Patient free of falls. Call light within reach. Bed in lowest position. Nonskid socks on. Bed alarm on. Hourly rounding continued. Goal: Absence of physical injury  Description: Absence of physical injury  Outcome: Met This Shift     Problem: Skin Integrity - Impaired:  Goal: Will show no infection signs and symptoms  Description: Will show no infection signs and symptoms  Outcome: Met This Shift  Note: Ongoing assessment & interventions provided throughout shift. Skin assessments provided. Encouraging/assisting patient to turn as needed. Will monitor for new skin breakdown/changes. Goal: Absence of new skin breakdown  Description: Absence of new skin breakdown  Outcome: Met This Shift  Note: No evidence of new skin breakdown. Will encourage turning. Will monitor for new skin breakdown/changes. Problem: Discharge Planning:  Goal: Participates in care planning  Description: Participates in care planning  Outcome: Ongoing  Note: Patient informed of all changes made in care and incorporation into discharge plans. Will inform patient of changes. Problem: Discharge Planning:  Goal: Discharged to appropriate level of care  Description: Discharged to appropriate level of care  Outcome: Ongoing  Note: Patient planned to be discharged home with  and children.  Will reassess need for higher care at discharge. Problem: Tissue Perfusion, Altered:  Goal: Circulatory function within specified parameters  Description: Circulatory function within specified parameters  Outcome: Ongoing  Note: Patient shows evidence of thrombosis in portal vein. Anticoagulant therapy initiated. Will monitor for changes in perfusion and hemodynamic status.

## 2020-08-26 LAB
ALBUMIN SERPL-MCNC: 3.3 G/DL (ref 3.5–5.1)
ALP BLD-CCNC: 223 U/L (ref 38–126)
ALT SERPL-CCNC: 111 U/L (ref 11–66)
ANION GAP SERPL CALCULATED.3IONS-SCNC: 10 MEQ/L (ref 8–16)
AST SERPL-CCNC: 61 U/L (ref 5–40)
BILIRUB SERPL-MCNC: 0.9 MG/DL (ref 0.3–1.2)
BILIRUBIN DIRECT: 0.5 MG/DL (ref 0–0.3)
BUN BLDV-MCNC: 9 MG/DL (ref 7–22)
CA 125: 19 U/ML
CALCIUM SERPL-MCNC: 8.4 MG/DL (ref 8.5–10.5)
CHLORIDE BLD-SCNC: 104 MEQ/L (ref 98–111)
CO2: 23 MEQ/L (ref 23–33)
CREAT SERPL-MCNC: 0.5 MG/DL (ref 0.4–1.2)
ERYTHROCYTE [DISTWIDTH] IN BLOOD BY AUTOMATED COUNT: 12.8 % (ref 11.5–14.5)
ERYTHROCYTE [DISTWIDTH] IN BLOOD BY AUTOMATED COUNT: 48 FL (ref 35–45)
GFR SERPL CREATININE-BSD FRML MDRD: > 90 ML/MIN/1.73M2
GLUCOSE BLD-MCNC: 65 MG/DL (ref 70–108)
HCT VFR BLD CALC: 38.8 % (ref 37–47)
HEMOGLOBIN: 12.9 GM/DL (ref 12–16)
HEPARIN LOW MOLECULAR WEIGHT: 0.75 U/ML
LIPASE: 19.8 U/L (ref 5.6–51.3)
MCH RBC QN AUTO: 34 PG (ref 26–33)
MCHC RBC AUTO-ENTMCNC: 33.2 GM/DL (ref 32.2–35.5)
MCV RBC AUTO: 102.4 FL (ref 81–99)
PLATELET # BLD: 289 THOU/MM3 (ref 130–400)
PMV BLD AUTO: 10 FL (ref 9.4–12.4)
POTASSIUM SERPL-SCNC: 3.9 MEQ/L (ref 3.5–5.2)
RBC # BLD: 3.79 MILL/MM3 (ref 4.2–5.4)
SODIUM BLD-SCNC: 137 MEQ/L (ref 135–145)
TOTAL PROTEIN: 6.4 G/DL (ref 6.1–8)
WBC # BLD: 7.4 THOU/MM3 (ref 4.8–10.8)

## 2020-08-26 PROCEDURE — 80053 COMPREHEN METABOLIC PANEL: CPT

## 2020-08-26 PROCEDURE — 6370000000 HC RX 637 (ALT 250 FOR IP): Performed by: FAMILY MEDICINE

## 2020-08-26 PROCEDURE — 6370000000 HC RX 637 (ALT 250 FOR IP): Performed by: INTERNAL MEDICINE

## 2020-08-26 PROCEDURE — 86038 ANTINUCLEAR ANTIBODIES: CPT

## 2020-08-26 PROCEDURE — C9113 INJ PANTOPRAZOLE SODIUM, VIA: HCPCS | Performed by: INTERNAL MEDICINE

## 2020-08-26 PROCEDURE — 81241 F5 GENE: CPT

## 2020-08-26 PROCEDURE — 2580000003 HC RX 258: Performed by: INTERNAL MEDICINE

## 2020-08-26 PROCEDURE — 1200000000 HC SEMI PRIVATE

## 2020-08-26 PROCEDURE — 85520 HEPARIN ASSAY: CPT

## 2020-08-26 PROCEDURE — 85027 COMPLETE CBC AUTOMATED: CPT

## 2020-08-26 PROCEDURE — 99231 SBSQ HOSP IP/OBS SF/LOW 25: CPT | Performed by: SURGERY

## 2020-08-26 PROCEDURE — APPSS30 APP SPLIT SHARED TIME 16-30 MINUTES: Performed by: NURSE PRACTITIONER

## 2020-08-26 PROCEDURE — 6360000002 HC RX W HCPCS: Performed by: INTERNAL MEDICINE

## 2020-08-26 PROCEDURE — 83690 ASSAY OF LIPASE: CPT

## 2020-08-26 PROCEDURE — 85300 ANTITHROMBIN III ACTIVITY: CPT

## 2020-08-26 PROCEDURE — 2580000003 HC RX 258: Performed by: FAMILY MEDICINE

## 2020-08-26 PROCEDURE — 36415 COLL VENOUS BLD VENIPUNCTURE: CPT

## 2020-08-26 PROCEDURE — 99233 SBSQ HOSP IP/OBS HIGH 50: CPT | Performed by: FAMILY MEDICINE

## 2020-08-26 PROCEDURE — 86304 IMMUNOASSAY TUMOR CA 125: CPT

## 2020-08-26 PROCEDURE — 94760 N-INVAS EAR/PLS OXIMETRY 1: CPT

## 2020-08-26 PROCEDURE — 81240 F2 GENE: CPT

## 2020-08-26 PROCEDURE — 82248 BILIRUBIN DIRECT: CPT

## 2020-08-26 RX ORDER — OXYCODONE HYDROCHLORIDE 5 MG/1
5 TABLET ORAL EVERY 4 HOURS PRN
Status: DISCONTINUED | OUTPATIENT
Start: 2020-08-26 | End: 2020-08-28 | Stop reason: HOSPADM

## 2020-08-26 RX ORDER — OXYCODONE HYDROCHLORIDE 5 MG/1
10 TABLET ORAL EVERY 4 HOURS PRN
Status: DISCONTINUED | OUTPATIENT
Start: 2020-08-26 | End: 2020-08-28 | Stop reason: HOSPADM

## 2020-08-26 RX ADMIN — OXYCODONE 10 MG: 5 TABLET ORAL at 17:27

## 2020-08-26 RX ADMIN — PANTOPRAZOLE SODIUM 40 MG: 40 INJECTION, POWDER, FOR SOLUTION INTRAVENOUS at 08:49

## 2020-08-26 RX ADMIN — ACETAMINOPHEN 650 MG: 325 TABLET ORAL at 19:54

## 2020-08-26 RX ADMIN — OXYCODONE 10 MG: 5 TABLET ORAL at 22:18

## 2020-08-26 RX ADMIN — APIXABAN 10 MG: 5 TABLET, FILM COATED ORAL at 21:33

## 2020-08-26 RX ADMIN — ACETAMINOPHEN 650 MG: 325 TABLET ORAL at 05:51

## 2020-08-26 RX ADMIN — SODIUM CHLORIDE: 9 INJECTION, SOLUTION INTRAVENOUS at 08:37

## 2020-08-26 RX ADMIN — ENOXAPARIN SODIUM 120 MG: 120 INJECTION SUBCUTANEOUS at 08:49

## 2020-08-26 RX ADMIN — HYDROMORPHONE HYDROCHLORIDE 0.5 MG: 1 INJECTION, SOLUTION INTRAMUSCULAR; INTRAVENOUS; SUBCUTANEOUS at 02:10

## 2020-08-26 RX ADMIN — SODIUM CHLORIDE: 9 INJECTION, SOLUTION INTRAVENOUS at 17:28

## 2020-08-26 RX ADMIN — OXYCODONE 10 MG: 5 TABLET ORAL at 12:36

## 2020-08-26 RX ADMIN — HYDROMORPHONE HYDROCHLORIDE 0.5 MG: 1 INJECTION, SOLUTION INTRAMUSCULAR; INTRAVENOUS; SUBCUTANEOUS at 05:51

## 2020-08-26 ASSESSMENT — PAIN SCALES - GENERAL
PAINLEVEL_OUTOF10: 7
PAINLEVEL_OUTOF10: 7
PAINLEVEL_OUTOF10: 6
PAINLEVEL_OUTOF10: 7
PAINLEVEL_OUTOF10: 8
PAINLEVEL_OUTOF10: 6
PAINLEVEL_OUTOF10: 7
PAINLEVEL_OUTOF10: 4
PAINLEVEL_OUTOF10: 3
PAINLEVEL_OUTOF10: 0
PAINLEVEL_OUTOF10: 3
PAINLEVEL_OUTOF10: 7
PAINLEVEL_OUTOF10: 6

## 2020-08-26 ASSESSMENT — PAIN DESCRIPTION - FREQUENCY
FREQUENCY: CONTINUOUS

## 2020-08-26 ASSESSMENT — PAIN DESCRIPTION - PAIN TYPE
TYPE: ACUTE PAIN

## 2020-08-26 ASSESSMENT — PAIN - FUNCTIONAL ASSESSMENT
PAIN_FUNCTIONAL_ASSESSMENT: ACTIVITIES ARE NOT PREVENTED

## 2020-08-26 ASSESSMENT — PAIN DESCRIPTION - PROGRESSION
CLINICAL_PROGRESSION: GRADUALLY IMPROVING
CLINICAL_PROGRESSION: NOT CHANGED
CLINICAL_PROGRESSION: GRADUALLY IMPROVING
CLINICAL_PROGRESSION: NOT CHANGED
CLINICAL_PROGRESSION: GRADUALLY IMPROVING
CLINICAL_PROGRESSION: NOT CHANGED
CLINICAL_PROGRESSION: GRADUALLY IMPROVING
CLINICAL_PROGRESSION: GRADUALLY IMPROVING

## 2020-08-26 ASSESSMENT — PAIN DESCRIPTION - DESCRIPTORS
DESCRIPTORS: CRAMPING
DESCRIPTORS: CRAMPING;SHARP
DESCRIPTORS: CRAMPING
DESCRIPTORS: CRAMPING

## 2020-08-26 ASSESSMENT — PAIN DESCRIPTION - ORIENTATION
ORIENTATION: MID
ORIENTATION: LOWER

## 2020-08-26 ASSESSMENT — PAIN DESCRIPTION - ONSET
ONSET: ON-GOING

## 2020-08-26 ASSESSMENT — PAIN DESCRIPTION - LOCATION
LOCATION: BACK
LOCATION: ABDOMEN

## 2020-08-26 NOTE — PLAN OF CARE
Problem: Pain:  Goal: Pain level will decrease  Description: Pain level will decrease  Outcome: Ongoing  Note: Patient reporting pain 8/10 with goal of 2/10. Patient satisfied with current interventions including rest and repositioning. Pain assessments ongoing. Problem: Falls - Risk of:  Goal: Will remain free from falls  Description: Will remain free from falls  Outcome: Ongoing  Note: Patient free from falls this shift. Patient using call light appropriately. Call light in reach, fall sign posted, nonskid footwear on, hourly rounding, bed alarm on, bed rails up x2. Patient at risk for falls due to generalized weakness. Problem: Discharge Planning:  Goal: Discharged to appropriate level of care  Description: Discharged to appropriate level of care  Outcome: Ongoing  Note: Discharge plans to return home discussed with patient. Problem: Skin Integrity - Impaired:  Goal: Will show no infection signs and symptoms  Description: Will show no infection signs and symptoms  Outcome: Ongoing  Note: No new skin breakdown noted. Patient turns self and encouraged to reposition every 2 hours. Skin assessments ongoing. Problem: Nutrition Deficit:  Goal: Ability to achieve adequate nutritional intake will improve  Description: Ability to achieve adequate nutritional intake will improve  Outcome: Ongoing  Note: Patient NPO at time of transfer, now started on full liquids. Care plan reviewed with patient. Patient verbalizes understanding of the plan of care and contributes to goal setting.

## 2020-08-26 NOTE — PROGRESS NOTES
Surg patient seen in consult to follow after review of MRI I feel patient's pain is due to SMV possible portal vein thrombosis MRI indicates pancreatic mass seen on CT may also be due to thrombosed peripancreatic vessels discussed with pharmacy patient is on Lovenox therapeutic

## 2020-08-26 NOTE — PROGRESS NOTES
Being transferred to [place] [address] [address].  Report given and all of the patient's belongings and flowers sent with the patient. Taken via [facility] and transport to [place] [address].

## 2020-08-26 NOTE — PROGRESS NOTES
Hospitalist Progress Note      Patient:  Ping Campbell      Unit/Bed:3B-21/021-A    YOB: 1980    MRN: [id number]       Acct: [de-identified]     PCP: Marissa Rico MD    Date of Admission: 8/24/2020    Assessment/Plan:    1. Acute pancreatitis -- ?due to mesenteric vein thrombosis but appear chronic as has collaterals per MRCP 8/25/2020 --> apprec GI assist -- lipase normal on admission 8/14 and again 8/25 and 8/26 but CLINICAL picture and MRI findings 8/25/2020 of pancreas appearing inflamed with enlarged and edematous pancreas and peripancreatic/mesenteric edema treating as acute pancreatitis  -- cont NPO, IVF but decrease to 100 mL/hr 8/26->   -- add po oxycodone 5-10 mg q 4 hrs prn pain, cont prn dilaudid for severe pain and monitor pain med use closely  -- no obstructive process noted per MRI abd 8/25/2020-  Hx cholecystectomy  -- Denies any significant ETOH use   -- Not on any medications to cause  -- check lipids 8/27  -- Dr. Virgilio Morse ordered LEWIS, factor 5 leiden, Antithrombin III, prothrombin,  on 8/25 and (p) as of 8/26/2020   2. 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 Damon Minus -> d/w Dr. Damon Minus 8/26 plans for [facility] -> due to prior hx of clots (PE) and now current thrombosis no matter if chronic or acute this would indicate a 2nd clot and thus needs lifelong anticoagulation -> he recommended starting eliquis (pt has been on previously) thus started 10 mg bid 8/26 x 1 week then will need 5 mg bid.   Pt educated and agrees to tx.  -- hypercoag w/u as outpt per Dr. Damon Minus  -- per MRI reading rec f/u CT/MRI in 1 mo to re-evaluate  -- Dr. Virgilio Morse ordered LEWIS, factor 5 leiden, Antithrombin III, prothrombin,  on 8/25 and (p) as of 8/26/2020   -- liver u/s 8/24/2020 with Suboptimal evaluation of the hepatic vasculature due to multiple collateral vessels and likely chronic thrombosis of the portal vein  3. Upper abd pain -- pt clinically looks and acts like acute pancreatitis --> see tx as above --  pain did not improve with PPI/carafate as outpt thus less likely PUD   4. Clustered mass lesions in pancreatic neck -- noted per MRI abd 8/26/2020:   clustered mass lesions within the pancreatic neck which are intermediate signal    intensity on the T2-weighted images and intermediate signal intensity on the fat-sat T1 precontrast images. These lesions do not enhance on the postcontrast images however appear to connect and have a serpiginous appearance and distribution suggesting    the possibility of thrombosed vessels    -- recommends f/u MRI or CT in 1 mo   -- await any further GI recs -> ?EUS/?bx vs f/u MRI/CT  5. Elevated LFT, hyperbilirubinemia -- likely due to #1,2 as normal 8/19/2020  -- GI following - improving slightly each day -> on 8/26 = AST 61 (216 on admission),  (205), alk phos 223 (251), bili 0.9 (1.9) --> cont trend -- avoid APAP for now --per MRI abd 8/25 = lobular contour of the liver. The hepatic parenchymal echogenicity is within normal limits  -- check hepatitis panel 8/27   -- Dr. Tyler Mendoza ordered LEWIS, factor 5 leiden, Antithrombin III, prothrombin,  on 8/25 and (p) as of 8/26/2020   6. Lymphadenopathy along the gastrohepatic ligament, within the periportal region, adjacent to the pancreatic head and within the periceliac region -- noted per MRCP 8/25/2020 - notes \"benign and malignant etiologies should be considered\" -- await further GI recs - ? EUS - ?bx vs f/u CTP/MRI -- await recs  7. Macrocytosis -- likely related to splenectomy? -- check B12/folate 8/27  8. Hx ITP s/p splenectomy -- plts stable in 200's  9.  Hx prior PE -- was on coumadin in past and eliquis for DVT prophylaxis for foot surgery in past -- see #2 -- no signs of PE or DVT.  10. Hx ventral hernia with mesh repair -- apprec Dr. Orville Medina eval - not a current issue. [address] Hx cholecystectomy -- no significant duct dilation on MRCP 8/25/2020  12. Morbid Obesity Body mass index is 43.53 kg/m². Dispo  -- 8/26 --> apprec consultants - cont NPO, IVF but decrease rate due to swelling and wt trending up. Add po oxycodone prn for pain (pt requesting oral med) and cont dilaudid but limit use. Awaiting Gi recs for further diet. LFT trending down - cont monitor;  D/w Dr. Sanjeev Lion about anticoag plan as on therapeutic lovenox currently -> recommends eliquis at d/c - pt agreeable thus started this pm and lovenox stopped; Cont monitor lytes, renal fxn, LFT, BP, fluid status, pain, CBC. Chief Complaint: abdominal pain    Hospital Course: Kris Negrete is a 44 y.o. female with hx ITP s/p splenectomy, DVT/PE, cholecystectomy presenting with epigastric abd pain. She was recently started on PPI and carafate by PCP but pain worsened and started to radiate to her back thus came to ER for further eval and tx. In the ED, HD stable. Labs unremarkable. PLT 295K. Hb 14.4. WBC 7.9. LFT's elevated, normal lipase. CT A/P showing hypoattenuation within the pancreas could also reflect pancreatic necrosis. Given patient's age pancreatic necrosis is thought to be more likely. Overall pancreas is enlarged. Clinical correlation for etiology of pancreatitis is recommended. Further evaluation may be clinically warranted. 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 Multiple collateral vessels suggest that this is a chronic process. There is also probable thrombosis of a varicose vein superior to the pancreas versus lymph node. -- Due to elevated LFT, abn CT abd GI and general surgery consulted for concern for pancreatic necrosis.   MRCP done 8/25/2020 with multiple findings above  --     Subjective/HPI:   -- 8/26/2020  --> pt c/o mid/upper abd pain - constant and asking for oral pain meds as wants something to last longer than dilaudid. No associated n/v. Denies cp, sob. Starting to feel \"swollen\". +flatus but NO BM since admission. Denies f/c. On RA. Afebrile. Ambulating without difficulty. PMH, SURGICAL HX, FH, SOCIAL HX reviewed and updated as needed. Medications:  Reviewed    Infusion Medications    sodium chloride 150 mL/hr at 08/26/20 6964     Scheduled Medications    pantoprazole  40 mg Intravenous QAM AC    sodium chloride flush  10 mL Intravenous 2 times per day    enoxaparin  1 mg/kg Subcutaneous Q12H     PRN Meds: oxyCODONE **OR** oxyCODONE, sodium chloride flush, acetaminophen **OR** acetaminophen, polyethylene glycol, promethazine **OR** ondansetron, HYDROmorphone      Intake/Output Summary (Last 24 hours) at 8/26/2020 1221  Last data filed at 8/26/2020 1111  Gross per 24 hour   Intake 3375.63 ml   Output 1600 ml   Net 1775.63 ml       Diet:  Diet NPO Effective Now    Exam:  BP (!) 142/77   Pulse 66   Temp 98.4 °F (36.9 °C) (Oral)   Resp 18   Ht 5' 7\" (1.702 m)   Wt 277 lb 14.4 oz (126.1 kg)   SpO2 95%   BMI 43.53 kg/m²     General appearance: No apparent distress, appears stated age and cooperative. HEENT: Pupils equal, round, and reactive to light. Conjunctivae/corneas clear. Neck: Supple, with full range of motion. Trachea midline. Respiratory:  Normal respiratory effort. Clear to auscultation, bilaterally without Rales/Wheezes/Rhonchi. No respiratory distress or accessory muscle use. Cardiovascular: Regular rate and rhythm with normal S1/S2 without murmurs, rubs or gallops. Abdomen: Soft, +TTP mid upper abd with mild palpation, non-distended but obese with normal bowel sounds. No rebound or guarding  Musculoskeletal: trace edema BLE, no calf TTP. Full range of motion without deformity. Skin: Skin color, texture, turgor normal.  No rashes or lesions.   Neurologic:  Neurovascularly intact without any focal sensory/motor deficits. Cranial nerves: II-XII intact, grossly non-focal.  Psychiatric: Alert and oriented, thought content appropriate, normal insight  Capillary Refill: Brisk,< 3 seconds   Peripheral Pulses: +2 palpable, equal bilaterally       All labs reviewed and interpreted by me:  Labs:   Recent Labs     08/25/20  0436 08/25/20  1824 08/26/20  0411   WBC 8.1 7.0 7.4   HGB 12.9 12.2 12.9   HCT 39.3 37.1 38.8    279 289     Recent Labs     08/24/20  1228 08/25/20  0436 08/26/20  0411    139 137   K 4.2 3.9 3.9   CL 99 105 104   CO2 27 24 23   BUN 11 11 9   CREATININE 0.6 0.5 0.5   CALCIUM 9.3 8.6 8.4*   PHOS  --  3.1  --      Recent Labs     08/24/20  1228 08/25/20  0436 08/26/20  0411   * 110* 61*   * 146* 111*   BILIDIR  --  0.6* 0.5*   BILITOT 1.9* 1.3* 0.9   ALKPHOS 251* 218* 223*     Recent Labs     08/25/20  0436   INR 1.18*     No results for input(s): CKTOTAL, TROPONINT in the last 72 hours. Urinalysis:      Lab Results   Component Value Date    NITRU NEGATIVE 08/24/2020    WBCUA 10-15 08/24/2020    WBCUA 0-2 10/30/2011    BACTERIA MODERATE 08/24/2020    RBCUA 5-10 08/24/2020    BLOODU MODERATE 08/24/2020    SPECGRAV >1.030 08/24/2020    GLUCOSEU NEGATIVE 11/14/2014       All radiology images and reports reviewed and interpreted by me:  Radiology:  MRI [place] [id number]   Final Result   1. There is a lobular contour of the liver. The hepatic parenchymal echogenicity is within normal limits. There is persistent altered patchy irregular enhancement within the lateral segment of the left hepatic lobe. There is no well-defined mass lesion. There is mild intrahepatic biliary dilatation. The main, left and right portal veins are extremely atretic however no thrombus is seen within these vessels. The portal venous complex is not visualized. The portal venous confluence is not visualized and    therefore probably thrombosed.  There is also thrombosis with the lack of contrast within the superior mesenteric vein. The etiology of the venous thrombosis is indeterminate however they be related to chronic pancreatitis. There is also thrombosis with    the lack of contrast within the residual splenic vein. In addition, there are numerous venous collaterals most likely related to the thrombosed vasculature. Clinical management is recommended. A follow-up CT examination/MRI examination of the abdomen in    one month is recommended to confirm resolution/stability unless other clinical management is indicated. 2. Cholecystectomy. As previously noted there is mild intrahepatic biliary dilatation however the common duct is normal size measuring 0.5 cm in transverse dimension. 3. The pancreas is enlarged and edematous with peripancreatic and mesenteric edema which in the right clinical setting is consistent with acute pancreatitis. There are clustered mass lesions within the pancreatic neck which are intermediate signal    intensity on the T2-weighted images and intermediate signal intensity on the fat-sat T1 precontrast images. These lesions do not enhance on the postcontrast images however appear to connect and have a serpiginous appearance and distribution suggesting    the possibility of thrombosed vessels. No additional mass lesion is seen and the pancreatic enhancement is otherwise homogeneous. The pancreatic duct is not dilated and there is no pancreatic divisum. Clinical management is recommended. A follow-up CT    examination/MRI examination of the abdomen in one month is recommended to confirm resolution/stability unless other clinical management is indicated. 4. Given the extensive venous collaterals is difficult to distinguish these from lymphadenopathy however given this limitation there is lymphadenopathy along the gastrohepatic ligament, within the periportal region, adjacent to the pancreatic head and    within the periceliac region.  Benign and malignant etiologies should be considered. Clinical management is recommended. A follow-up CT examination/MRI examination of the abdomen in one month is recommended to confirm resolution/stability unless other    clinical management is indicated. **This report has been created using voice recognition software. It may contain minor errors which are inherent in voice recognition technology. **      Final report electronically signed by Dr. Yari Sorto on 8/25/2020 7:07 AM      US LIVER   Final Result    IMPRESSION:   Suboptimal evaluation of the hepatic vasculature due to multiple collateral vessels and likely chronic thrombosis of the portal vein. Heterogeneous attenuation of the liver. Correlation with LFTs is advised. **This report has been created using voice recognition software. It may contain minor errors which are inherent in voice recognition technology. **      Final report electronically signed by Dr. Nevaeh Alvarez on 8/24/2020 6:20 PM      US DUP ABD PEL RETRO SCROT COMPLETE   Final Result    IMPRESSION:   Suboptimal evaluation of the hepatic vasculature due to multiple collateral vessels and likely chronic thrombosis of the portal vein. Heterogeneous attenuation of the liver. Correlation with LFTs is advised. **This report has been created using voice recognition software. It may contain minor errors which are inherent in voice recognition technology. **      Final report electronically signed by Dr. Nevaeh Alvarez on 8/24/2020 6:20 PM      CT ABDOMEN PELVIS W IV CONTRAST Additional Contrast? None   Final Result   Addendum 1 of 1   ** ADDENDUM #1 **      Hypoattenuation within the pancreas could also reflect pancreatic    necrosis. Given patient's age pancreatic necrosis is thought to be more    likely. Overall pancreas is enlarged. Clinical correlation for etiology of    pancreatitis is recommended. Further evaluation may be clinically    warranted.    Additional note is made of probable thrombosis of the superior mesenteric    vein as well as part of the portal vein. Multiple collateral vessels    suggest that this is a chronic process. There is also probable thrombosis of a varicose vein superior to the    pancreas versus lymph node. Findings were discussed with Dr. Ney Allen at 2:32 PM on 8/24/2020      Final report electronically signed by Dr. Jairo Cardoza on 8/24/2020 2:36 PM      ** ORIGINAL REPORT **   PROCEDURE: CT ABDOMEN PELVIS W IV CONTRAST      CLINICAL INFORMATION: severe abdominal pain, r/o obstruction . COMPARISON: October 31, 2014      TECHNIQUE: 5 mm axial CT images were obtained through the abdomen and    pelvis after the administration of intravenous and oral contrast. Coronal    and sagittal reconstructions were obtained. All CT scans at this facility use dose modulation, iterative    reconstruction, and/or weight-based dosing when appropriate to reduce    radiation dose to as low as reasonably achievable. FINDINGS:      Lung bases are clear. There is a duodenal diverticulum. Spleen is not well seen and may be shrunken with small splenule in the    left upper quadrant versus surgically removed. There is hypoattenuation the mid body of the pancreas concerning for    underlying lesion area measures approximately 2 x 2 centimeters. Second    possible cyst/hypoattenuation in the distal body measuring 3.5 x 1.2 cm. There is peripancreatic infiltration and    edema concerning for superimposed pancreatitis. Correlation with    pancreatic serology is advised. Underlying neoplasm is not excluded. Clinical correlation is advised. There are low attenuation superior to the pancreas which could be an    abnormal 2.1 x 2 cm lymph node versus prominent varices. .   Cholecystectomy. Mild biliary dilatation likely postsurgical. The main    portal vein is somewhat diminutive and vessels in the sam hepatis may be    collateral flow.    There is mesenteric infiltration and edema. There are retroperitoneal    lymph nodes as well as varices. Kidneys are symmetric without hydronephrosis. Adrenal glands are    unremarkable. There is moderate stool throughout the colon. Correlate for constipation. There is an ovoid fatty lesion measuring 1.6 x 4 just deep to the anterior    abdominal wall and to the right of midline nonspecific. There is prior    hernia mesh repair with recurrent or persistent fat-containing anterior    abdominal wall hernia. Scattered stool in the colon. There is no bowel wall thickening or    obstruction. Diverticulosis without evidence for diverticulitis. Normal appendix with small calcified appendicolith. No bladder wall thickening. Trace fluid in the pelvis. No acute osseous findings.          Final          Diet: Diet NPO Effective Now    Barnes: no    Microbiology:  None    Tele review last 24 hrs:  SR, NO arrhythmias    DVT prophylaxis: [] Lovenox                                 [] SCDs                                 [] SQ Heparin                                 [] Encourage ambulation           [x] Already on Anticoagulation -- therapeutic lovenox 1 mg/kg bid     Disposition:    [x] Home       [] TCU       [] Rehab       [] Psych       [] SNF       [] [place]       [] Other-    Code Status: Full Code    PT/OT Eval Status: not needed      Electronically signed by Ney Mark MD on 8/26/2020 at 12:21 PM

## 2020-08-26 NOTE — PROGRESS NOTES
Labs     08/24/20  1228   AMYLASE 37     Lactic Acid: No results for input(s): LACTA in the last 72 hours. Troponin: No results for input(s): CKTOTAL, CKMB, TROPONINT in the last 72 hours. BNP: No results for input(s): BNP in the last 72 hours. Lipids: No results for input(s): CHOL, TRIG, HDL, LDLCALC in the last 72 hours. Invalid input(s): LDL  ABGs: No results found for: PH, PCO2, PO2, HCO3, O2SAT    Radiology reports as per the Radiologist  Radiology: Mri Abdomen W Wo Contrast    Result Date: 8/25/2020  PROCEDURE: MRI ABDOMEN W WO CONTRAST CLINICAL INFORMATION: Pancreatitis of unknown etiology and possible pancreatic mass. COMPARISON: Liver ultrasound dated 8/24/2020 and CT abdomen/pelvis dated 8/24/2020. TECHNIQUE: Routine MRI abdomen without and with IV contrast.  Routine MRCP was also performed. The MRCP examination includes 3D reconstructions of the biliary tree and the pancreatic duct. CONTRAST: 20 mL ProHance intravenously. FINDINGS: LIVER: 1. The liver is normal size. 2. There is a lobular contour of the liver. The hepatic parenchymal echogenicity is within normal limits. 3. There is persistent altered patchy irregular enhancement within the lateral segment of the left hepatic lobe. There is no well-defined mass lesion. 4. There is mild intrahepatic biliary dilatation. 5. The main, left and right portal veins are extremely atretic however no thrombus is seen within these vessels. The portal venous complex is not visualized. The portal venous confluence is not visualized and therefore probably thrombosed. There is also thrombosis with the lack of contrast within the superior mesenteric vein. Etiology of the venous thrombosis is indeterminate however may be related to chronic pancreatitis. There is also thrombosis with the lack of contrast within the residual splenic vein. In addition, there are numerous venous collaterals most likely related to the thrombosed vasculature. GALLBLADDER: 1. Cholecystectomy. BILIARY TREE: 1. The common duct is normal size measuring 0.5 cm in transverse dimension. 2. There is no choledocholithiasis. PANCREAS: 1. The pancreas is enlarged and edematous with peripancreatic and mesenteric edema which in the right clinical setting is consistent with acute pancreatitis. There are clustered mass lesions within the pancreatic neck which are intermediate signal intensity on the T2-weighted images and intermediate signal intensity on the fat-sat T1 precontrast images. These lesions do not enhance on the postcontrast images however appear to connect and have a serpiginous appearance and distribution suggesting the possibility of thrombosed vessels. No additional mass lesion is seen and the pancreatic enhancement is otherwise homogeneous. The pancreatic duct is not dilated and there is no pancreatic divisum. SPLEEN/ADRENAL GLANDS/KIDNEYS: 1. The patient is had a splenectomy. There is a 1.8 cm mass within the splenic bed suggesting a splenule. 2. The bilateral adrenal glands are unremarkable. 3. There is a moderate amount of mesenteric edema/fluid tracking posteriorly to the left. The bilateral kidneys are otherwise unremarkable. BOWEL: 1. The bowel gas pattern is nonobstructive. FREE AIR/FREE FLUID/INFLAMMATION: 1. As previously described. LYMPHADENOPATHY: 1. Given the extensive venous collaterals is difficult to distinguish these from lymphadenopathy however given this limitation there is lymphadenopathy along the gastrohepatic ligament, within the periportal region, adjacent to the pancreatic head and within the periceliac region. Benign and malignant etiologies should be considered. ABDOMINAL AORTA/IVC: Unremarkable. LUNG BASES: Unremarkable. MUSCULOSKELETAL: Unremarkable. OTHER: None. 1. There is a lobular contour of the liver. The hepatic parenchymal echogenicity is within normal limits.  There is persistent altered patchy irregular enhancement within the lateral segment of the left hepatic lobe. There is no well-defined mass lesion. There is mild intrahepatic biliary dilatation. The main, left and right portal veins are extremely atretic however no thrombus is seen within these vessels. The portal venous complex is not visualized. The portal venous confluence is not visualized and therefore probably thrombosed. There is also thrombosis with the lack of contrast within the superior mesenteric vein. The etiology of the venous thrombosis is indeterminate however they be related to chronic pancreatitis. There is also thrombosis with the lack of contrast within the residual splenic vein. In addition, there are numerous venous collaterals most likely related to the thrombosed vasculature. Clinical management is recommended. A follow-up CT examination/MRI examination of the abdomen in one month is recommended to confirm resolution/stability unless other clinical management is indicated. 2. Cholecystectomy. As previously noted there is mild intrahepatic biliary dilatation however the common duct is normal size measuring 0.5 cm in transverse dimension. 3. The pancreas is enlarged and edematous with peripancreatic and mesenteric edema which in the right clinical setting is consistent with acute pancreatitis. There are clustered mass lesions within the pancreatic neck which are intermediate signal intensity on the T2-weighted images and intermediate signal intensity on the fat-sat T1 precontrast images. These lesions do not enhance on the postcontrast images however appear to connect and have a serpiginous appearance and distribution suggesting the possibility of thrombosed vessels. No additional mass lesion is seen and the pancreatic enhancement is otherwise homogeneous. The pancreatic duct is not dilated and there is no pancreatic divisum. Clinical management is recommended.  A follow-up CT examination/MRI examination of the abdomen in one month is recommended to confirm resolution/stability reconstructions were obtained. All CT scans at this facility use dose modulation, iterative reconstruction, and/or weight-based dosing when appropriate to reduce radiation dose to as low as reasonably achievable. FINDINGS: Lung bases are clear. There is a duodenal diverticulum. Spleen is not well seen and may be shrunken with small splenule in the left upper quadrant versus surgically removed. There is hypoattenuation the mid body of the pancreas concerning for underlying lesion area measures approximately 2 x 2 centimeters. Second possible cyst/hypoattenuation in the distal body measuring 3.5 x 1.2 cm. There is peripancreatic infiltration and  edema concerning for superimposed pancreatitis. Correlation with pancreatic serology is advised. Underlying neoplasm is not excluded. Clinical correlation is advised. There are low attenuation superior to the pancreas which could be an abnormal 2.1 x 2 cm lymph node versus prominent varices. . Cholecystectomy. Mild biliary dilatation likely postsurgical. The main portal vein is somewhat diminutive and vessels in the sam hepatis may be collateral flow. There is mesenteric infiltration and edema. There are retroperitoneal lymph nodes as well as varices. Kidneys are symmetric without hydronephrosis. Adrenal glands are unremarkable. There is moderate stool throughout the colon. Correlate for constipation. There is an ovoid fatty lesion measuring 1.6 x 4 just deep to the anterior abdominal wall and to the right of midline nonspecific. There is prior hernia mesh repair with recurrent or persistent fat-containing anterior abdominal wall hernia. Scattered stool in the colon. There is no bowel wall thickening or obstruction. Diverticulosis without evidence for diverticulitis. Normal appendix with small calcified appendicolith. No bladder wall thickening. Trace fluid in the pelvis. No acute osseous findings.      Result Date: 8/24/2020  PROCEDURE: CT ABDOMEN PELVIS W IV CONTRAST CLINICAL INFORMATION: severe abdominal pain, r/o obstruction . COMPARISON: October 31, 2014 TECHNIQUE: 5 mm axial CT images were obtained through the abdomen and pelvis after the administration of intravenous and oral contrast. Coronal and sagittal reconstructions were obtained. All CT scans at this facility use dose modulation, iterative reconstruction, and/or weight-based dosing when appropriate to reduce radiation dose to as low as reasonably achievable. FINDINGS: Lung bases are clear. There is a duodenal diverticulum. Spleen is not well seen and may be shrunken with small splenule in the left upper quadrant versus surgically removed. There is hypoattenuation the mid body of the pancreas concerning for underlying lesion area measures approximately 2 x 2 centimeters. Second possible cyst/hypoattenuation in the distal body measuring 3.5 x 1.2 cm. There is peripancreatic infiltration and  edema concerning for superimposed pancreatitis. Correlation with pancreatic serology is advised. Underlying neoplasm is not excluded. Clinical correlation is advised. There are low attenuation superior to the pancreas which could be an abnormal 2.1 x 2 cm lymph node versus prominent varices. . Cholecystectomy. Mild biliary dilatation likely postsurgical. The main portal vein is somewhat diminutive and vessels in the sam hepatis may be collateral flow. There is mesenteric infiltration and edema. There are retroperitoneal lymph nodes as well as varices. Kidneys are symmetric without hydronephrosis. Adrenal glands are unremarkable. There is moderate stool throughout the colon. Correlate for constipation. There is an ovoid fatty lesion measuring 1.6 x 4 just deep to the anterior abdominal wall and to the right of midline nonspecific. There is prior hernia mesh repair with recurrent or persistent fat-containing anterior abdominal wall hernia. Scattered stool in the colon. There is no bowel wall thickening or obstruction.  Diverticulosis the portal vein. Heterogeneous attenuation of the liver. Correlation with LFTs is advised. **This report has been created using voice recognition software. It may contain minor errors which are inherent in voice recognition technology. ** Final report electronically signed by Dr. Donald Galarza on 8/24/2020 6:20 PM    Us Dup Abd Pel Retro Scrot Complete    Result Date: 8/24/2020  PROCEDURE: US LIVER, US DUP ABD PEL RETRO SCROT COMPLETE CLINICAL INFORMATION: also get dopplers of portal vein. COMPARISON: CT abdomen pelvis August 24, 2020. TECHNIQUE: Multiplanar sonographic images were obtained of the liver. FINDINGS: Suboptimal evaluation of the pancreas. Heterogeneous attenuation of the liver. Correlation with LFTs is advised. Portal vein is not well seen and is likely chronically thrombosed. Prominent hepatic arteries. Documented flow in the hepatic veins. Common bile duct was not well seen. IMPRESSION: Suboptimal evaluation of the hepatic vasculature due to multiple collateral vessels and likely chronic thrombosis of the portal vein. Heterogeneous attenuation of the liver. Correlation with LFTs is advised. **This report has been created using voice recognition software. It may contain minor errors which are inherent in voice recognition technology. ** Final report electronically signed by Dr. Donald Galarza on 8/24/2020 6:20 PM       Physical Exam:  Vitals: BP (!) 142/77   Pulse 66   Temp 98.4 °F (36.9 °C) (Oral)   Resp 18   Ht 5' 7\" (1.702 m)   Wt 277 lb 14.4 oz (126.1 kg)   SpO2 95%   BMI 43.53 kg/m²   24 hour intake/output:    Intake/Output Summary (Last 24 hours) at 8/26/2020 1132  Last data filed at 8/26/2020 1111  Gross per 24 hour   Intake 3375.63 ml   Output 1600 ml   Net 1775.63 ml     Last 3 weights:   Wt Readings from Last 3 Encounters:   08/26/20 277 lb 14.4 oz (126.1 kg)   08/19/20 276 lb 12.8 oz (125.6 kg)   07/03/19 288 lb (130.6 kg)       General appearance - oriented to person, place, and time and overweight  HEENT: Normocephalic and Atraumatic  Chest - clear to auscultation, no wheezes, rales or rhonchi, symmetric air entry  Cardiovascular - normal rate and regular rhythm  Abdomen - tenderness noted mid to left quadrant, soft   Neurological - Alert and oriented and Normal speech  Integumentary - Skin color, texture, turgor normal. No Rashes or lesions  Musculoskeletal -Full ROM times 4 extremities      DVT prophylaxis: [x] Lovenox  Therapeutic dosing                                  [] SCDs                                 [] SQ Heparin                                 [] Encourage ambulation           [] Already on Anticoagulation                 Assessment:  1. ? Pancreatitis  depsite normal amylase and lipase  2. SMV/PV thrombosis   3. Abnormal LFT  4. Morbid obesity  5. HX PE,   6. Surgical History, cholecystectomy and hernia repair with mesh     Active Problems:    Acute pancreatitis    Acute pancreatitis without infection or necrosis    Portal vein thrombosis    Mesenteric vein thrombosis    Abnormal LFTs    Morbid obesity ([address]    History of pulmonary embolism  Resolved Problems:    * No resolved hospital problems. *       Plan:  1. Conservative treatment  2. Bowel Rest  3. GI on board  4. Oncology following   5. IV hydration  6. Analgesia and antiemetics as needed  7. therapeutic Lovenox   8. Monitor Labs  9. No surgical intervention necessary, surgery signing off, call if needed       Electronically signed by ELEAZAR Manzano CNP on 8/26/2020 at 11:32 AM Patient seen and examined independently by me. Above discussed and I agree with CNP. Labs, cultures, and radiographs where available were reviewed. See orders for the updated patient care plan.     Sonya Gutiérrez MD, patient's pain is better chart reviewed patient has been placed on Eliquis per Dr. Sanjeev Lion patient states she is hungry I see no reason why we cannot begin full liquids surgical intervention is necessary we will recheck tomorrow and see how she tolerated diet  8/26/2020   7:30 PM

## 2020-08-26 NOTE — PROGRESS NOTES
Recent Labs     08/25/20  0436 08/26/20  0411   CREATININE 0.5 0.5       Lab Results   Component Value Date    HGB 12.9 08/26/2020    HCT 38.8 08/26/2020     08/26/2020       Estimated Creatinine Clearance: 208 mL/min (based on SCr of 0.5 mg/dL). Height:   Ht Readings from Last 1 Encounters:   08/24/20 5' 7\" (1.702 m)     Weight:  Wt Readings from Last 1 Encounters:   08/26/20 277 lb 14.4 oz (126.1 kg)       Enoxaparin Indication: Hepatic vein thrombus and mesenteric vein thrombus    Results for Neftaly Webster (MRN [id number]) as of 8/26/2020 13:53   8/26/2020 12:51   Heparin LMW  0.75       Plan: No change. Lovenox given at 0849 this morning and level drawn at 1251. Lovenox is therapeutic. Will continue to monitor.     Jennifer Aquino, PharmD   8/26/2020, 1:59 PM

## 2020-08-26 NOTE — PROGRESS NOTES
Pt admitted to  5 by wheelchair from 3A/B. IV normal saline infusing into the [facility] left, condition patent and no redness at a rate of 150 mls/ hour with about 400 mls in the bag still. IV site free of s/s of infection or infiltration. Vital signs obtained. Assessment complete. Oriented to room. All questions answered with no further questions at this time. Two nurse skin assessment performed by Gee Freeman RN and Alexi Guajardo RN. Oriented to room. Policies and procedures for  explained. A Fall prevention and safety brochure discussed with patient. Bed alarm on. Call light in reach.

## 2020-08-27 LAB
ALBUMIN SERPL-MCNC: 2.1 G/DL (ref 3.5–5.1)
ALP BLD-CCNC: 283 U/L (ref 38–126)
ALT SERPL-CCNC: 104 U/L (ref 11–66)
ANION GAP SERPL CALCULATED.3IONS-SCNC: 12 MEQ/L (ref 8–16)
AST SERPL-CCNC: 81 U/L (ref 5–40)
BASOPHILS # BLD: 0.4 %
BASOPHILS ABSOLUTE: 0 THOU/MM3 (ref 0–0.1)
BILIRUB SERPL-MCNC: 0.9 MG/DL (ref 0.3–1.2)
BUN BLDV-MCNC: 5 MG/DL (ref 7–22)
CALCIUM SERPL-MCNC: 8.6 MG/DL (ref 8.5–10.5)
CHLORIDE BLD-SCNC: 106 MEQ/L (ref 98–111)
CHOLESTEROL, TOTAL: 157 MG/DL (ref 100–199)
CO2: 13 MEQ/L (ref 23–33)
CREAT SERPL-MCNC: 0.5 MG/DL (ref 0.4–1.2)
EOSINOPHIL # BLD: 3.7 %
EOSINOPHILS ABSOLUTE: 0.2 THOU/MM3 (ref 0–0.4)
ERYTHROCYTE [DISTWIDTH] IN BLOOD BY AUTOMATED COUNT: 12.5 % (ref 11.5–14.5)
ERYTHROCYTE [DISTWIDTH] IN BLOOD BY AUTOMATED COUNT: 46.6 FL (ref 35–45)
FOLATE: > 20 NG/ML (ref 4.8–24.2)
GFR SERPL CREATININE-BSD FRML MDRD: > 90 ML/MIN/1.73M2
GLUCOSE BLD-MCNC: 77 MG/DL (ref 70–108)
HAV IGM SER IA-ACNC: NEGATIVE
HCT VFR BLD CALC: 37.2 % (ref 37–47)
HDLC SERPL-MCNC: 44 MG/DL
HEMOGLOBIN: 12.3 GM/DL (ref 12–16)
HEPATITIS B CORE IGM ANTIBODY: NEGATIVE
HEPATITIS B SURFACE ANTIGEN: NEGATIVE
HEPATITIS C ANTIBODY: NEGATIVE
IMMATURE GRANS (ABS): 0.01 THOU/MM3 (ref 0–0.07)
IMMATURE GRANULOCYTES: 0.2 %
LDL CHOLESTEROL CALCULATED: 96 MG/DL
LIPASE: 28.5 U/L (ref 5.6–51.3)
LYMPHOCYTES # BLD: 29.9 %
LYMPHOCYTES ABSOLUTE: 1.5 THOU/MM3 (ref 1–4.8)
MCH RBC QN AUTO: 33.2 PG (ref 26–33)
MCHC RBC AUTO-ENTMCNC: 33.1 GM/DL (ref 32.2–35.5)
MCV RBC AUTO: 100.5 FL (ref 81–99)
MONOCYTES # BLD: 20.6 %
MONOCYTES ABSOLUTE: 1 THOU/MM3 (ref 0.4–1.3)
NUCLEATED RED BLOOD CELLS: 0 /100 WBC
PLATELET # BLD: 312 THOU/MM3 (ref 130–400)
PMV BLD AUTO: 10.2 FL (ref 9.4–12.4)
POTASSIUM SERPL-SCNC: 5.4 MEQ/L (ref 3.5–5.2)
RBC # BLD: 3.7 MILL/MM3 (ref 4.2–5.4)
REASON FOR REJECTION: NORMAL
REJECTED TEST: NORMAL
SEG NEUTROPHILS: 45.2 %
SEGMENTED NEUTROPHILS ABSOLUTE COUNT: 2.2 THOU/MM3 (ref 1.8–7.7)
SODIUM BLD-SCNC: 131 MEQ/L (ref 135–145)
TOTAL PROTEIN: 6.9 G/DL (ref 6.1–8)
TRIGL SERPL-MCNC: 83 MG/DL (ref 0–199)
TSH SERPL DL<=0.05 MIU/L-ACNC: 2.2 UIU/ML (ref 0.4–4.2)
VITAMIN B-12: 1488 PG/ML (ref 211–911)
WBC # BLD: 4.9 THOU/MM3 (ref 4.8–10.8)

## 2020-08-27 PROCEDURE — 80074 ACUTE HEPATITIS PANEL: CPT

## 2020-08-27 PROCEDURE — 1200000000 HC SEMI PRIVATE

## 2020-08-27 PROCEDURE — 80061 LIPID PANEL: CPT

## 2020-08-27 PROCEDURE — 6370000000 HC RX 637 (ALT 250 FOR IP): Performed by: FAMILY MEDICINE

## 2020-08-27 PROCEDURE — 99232 SBSQ HOSP IP/OBS MODERATE 35: CPT | Performed by: NURSE PRACTITIONER

## 2020-08-27 PROCEDURE — 82746 ASSAY OF FOLIC ACID SERUM: CPT

## 2020-08-27 PROCEDURE — 83690 ASSAY OF LIPASE: CPT

## 2020-08-27 PROCEDURE — 80053 COMPREHEN METABOLIC PANEL: CPT

## 2020-08-27 PROCEDURE — 2580000003 HC RX 258: Performed by: FAMILY MEDICINE

## 2020-08-27 PROCEDURE — 36415 COLL VENOUS BLD VENIPUNCTURE: CPT

## 2020-08-27 PROCEDURE — 6360000002 HC RX W HCPCS: Performed by: INTERNAL MEDICINE

## 2020-08-27 PROCEDURE — 84443 ASSAY THYROID STIM HORMONE: CPT

## 2020-08-27 PROCEDURE — 6370000000 HC RX 637 (ALT 250 FOR IP): Performed by: INTERNAL MEDICINE

## 2020-08-27 PROCEDURE — 85025 COMPLETE CBC W/AUTO DIFF WBC: CPT

## 2020-08-27 PROCEDURE — 82607 VITAMIN B-12: CPT

## 2020-08-27 PROCEDURE — C9113 INJ PANTOPRAZOLE SODIUM, VIA: HCPCS | Performed by: INTERNAL MEDICINE

## 2020-08-27 RX ADMIN — ACETAMINOPHEN 650 MG: 325 TABLET ORAL at 02:42

## 2020-08-27 RX ADMIN — SODIUM CHLORIDE: 9 INJECTION, SOLUTION INTRAVENOUS at 02:52

## 2020-08-27 RX ADMIN — OXYCODONE 10 MG: 5 TABLET ORAL at 06:31

## 2020-08-27 RX ADMIN — PANTOPRAZOLE SODIUM 40 MG: 40 INJECTION, POWDER, FOR SOLUTION INTRAVENOUS at 06:30

## 2020-08-27 RX ADMIN — ACETAMINOPHEN 650 MG: 325 TABLET ORAL at 11:17

## 2020-08-27 RX ADMIN — APIXABAN 10 MG: 5 TABLET, FILM COATED ORAL at 09:16

## 2020-08-27 RX ADMIN — OXYCODONE 5 MG: 5 TABLET ORAL at 17:00

## 2020-08-27 RX ADMIN — APIXABAN 10 MG: 5 TABLET, FILM COATED ORAL at 20:20

## 2020-08-27 RX ADMIN — ACETAMINOPHEN 650 MG: 325 TABLET ORAL at 20:20

## 2020-08-27 ASSESSMENT — PAIN DESCRIPTION - LOCATION: LOCATION: BACK

## 2020-08-27 ASSESSMENT — PAIN SCALES - GENERAL
PAINLEVEL_OUTOF10: 3
PAINLEVEL_OUTOF10: 0
PAINLEVEL_OUTOF10: 4
PAINLEVEL_OUTOF10: 3
PAINLEVEL_OUTOF10: 0
PAINLEVEL_OUTOF10: 2
PAINLEVEL_OUTOF10: 3
PAINLEVEL_OUTOF10: 2
PAINLEVEL_OUTOF10: 5
PAINLEVEL_OUTOF10: 6
PAINLEVEL_OUTOF10: 2
PAINLEVEL_OUTOF10: 3

## 2020-08-27 ASSESSMENT — PAIN DESCRIPTION - PROGRESSION
CLINICAL_PROGRESSION: NOT CHANGED

## 2020-08-27 ASSESSMENT — PAIN DESCRIPTION - DESCRIPTORS: DESCRIPTORS: CRAMPING;SHARP

## 2020-08-27 ASSESSMENT — PAIN DESCRIPTION - ONSET: ONSET: ON-GOING

## 2020-08-27 ASSESSMENT — PAIN DESCRIPTION - ORIENTATION: ORIENTATION: LOWER

## 2020-08-27 ASSESSMENT — PAIN - FUNCTIONAL ASSESSMENT: PAIN_FUNCTIONAL_ASSESSMENT: ACTIVITIES ARE NOT PREVENTED

## 2020-08-27 ASSESSMENT — PAIN DESCRIPTION - FREQUENCY: FREQUENCY: CONTINUOUS

## 2020-08-27 ASSESSMENT — PAIN DESCRIPTION - PAIN TYPE: TYPE: ACUTE PAIN

## 2020-08-27 NOTE — PROGRESS NOTES
jaundice. LOWER EXTREMITIES:  No cyanosis, clubbing, or edema. NEURO:  Alert and oriented x4. Patient moves all extremities and has gross sensation in all extremities. Medications:  Scheduled Meds:   apixaban  10 mg Oral BID    pantoprazole  40 mg Intravenous QAM AC    sodium chloride flush  10 mL Intravenous 2 times per day     Continuous Infusions:   sodium chloride 100 mL/hr at 08/27/20 0252     PRN Meds:oxyCODONE **OR** oxyCODONE, sodium chloride flush, acetaminophen **OR** acetaminophen, polyethylene glycol, promethazine **OR** ondansetron, HYDROmorphone    Previous MRI ; just pancreas portion  3. The pancreas is enlarged and edematous with peripancreatic and mesenteric edema which in the right clinical setting is consistent with acute pancreatitis. There are clustered mass lesions within the pancreatic neck which are intermediate signal    intensity on the T2-weighted images and intermediate signal intensity on the fat-sat T1 precontrast images. These lesions do not enhance on the postcontrast images however appear to connect and have a serpiginous appearance and distribution suggesting    the possibility of thrombosed vessels. No additional mass lesion is seen and the pancreatic enhancement is otherwise homogeneous. The pancreatic duct is not dilated and there is no pancreatic divisum. Clinical management is recommended. A follow-up CT    examination/MRI examination of the abdomen in one month is recommended to confirm resolution/stability unless other clinical management is indicated. ASSESSMENT:     1. Pancreatitis per admission note,  but normal lipase 15.7 (was not elevated at admission either). Surgeon believes her pain and inflammation is from [facility] thrombosis and DR Roseline Romo is following. Pt is not ill appearing, and does not look like a pancreatitis patient and did not at time of consultation  2. Abdominal pain-periumbilical   3.  Suspected portal vein

## 2020-08-27 NOTE — CONSULTS
[address] [place] [address]                                  CONSULTATION    PATIENT NAME: Kerwin Muhammad                   :        1980  MED REC NO:   [id number]                           ROOM:       0003  ACCOUNT NO:   [de-identified]                           ADMIT DATE: 2020  PROVIDER:     Homero Rios. Derrick Drake MD    CONSULT DATE:  2020    CHIEF COMPLAINT:  Abdominal pain. Recent MRI showing apparent SMV  thrombosis. HISTORY OF PRESENT ILLNESS:  The patient is a 59-year-old white female,  well known to me, who in  underwent an attempted laparoscopic  cholecystectomy that had been converted to an open cholecystectomy as  the patient had significant varicosities in and around the  hepatoduodenal ligament. The patient then in  underwent a  splenectomy for her known ITP and then in  had a laparoscopic  ventral hernia repair by me. The patient has a remote history of having  DVT and pulmonary emboli, had been on Eliquis in the past and with the  splenectomy, her ITP was cured. The patient states at approximately  eight days ago, a week ago yesterday, she had onset of epigastric  abdominal pain. She eventually went to her PCP who thought she might  have an ulcer, placed her on antacid and Carafate. For two days she  felt better; however, in the past weekend, the pain got worse. She  presented to the emergency room, had a CT scan showing evidence of  possible pancreatitis and possible pancreatic mass, but her amylase and  lipase were negative. The patient then had an MRI yesterday revealing  apparent SMV and possible portal vein thrombosis. Surgical consultation  has been requested because of the abdominal pain. Prior to all this,  the patient states she has been doing well, having no issues. PAST MEDICAL HISTORY:  Positive for history of DVT and PE in ,  history of ITP.     PAST SURGICAL HISTORY: Surgeries include laparoscopic cholecystectomy  converted to open in 11/2014. She had a splenectomy in 03/2015 and then  a laparoscopic hernia repair in 12/2016. Prior to that, she had had a  tubal ligation and a remote endometrial ablation. HOME MEDICATIONS:  Carafate, Prilosec, probiotics, Lasix. ALLERGIES:  Josiane Cocks. SOCIAL HISTORY:  The patient is . She is a nonsmoker. Social  user of alcohol. FAMILY HISTORY:  Positive for hypertension, coronary artery disease,  CVA. REVIEW OF SYSTEMS:  A 10-point review of systems is otherwise negative  except as mentioned above. PHYSICAL EXAMINATION:  GENERAL:  The patient is a 49-year-old white female resting in her 3B  bed. She is in no distress, but as you can tell she is uncomfortable. HEAD, EARS, EYES, NOSE, AND THROAT:  Pupils are equal.  EOMs are intact. Sclerae are clear. CARDIAC:  S1, S2.  LUNGS:  Respirations are clear. ABDOMEN:  Mildly distended, tender, but no real guarding. Certainly, no  peritoneal irritation, most of the tenderness to the upper abdomen. Ventral hernia appears to be well healed. EXTREMITIES:  Upper and lower extremities show some mild peripheral  edema in the lower extremities, but otherwise good range of motion. Upper extremities are normal.    LABORATORY DATA:  Revealed on liver function test on admission, alk phos  85, ALT is 23, mildly elevated. Amylase is 54 and lipase is 33. Yesterday, her lipase was 15.7 with an amylase of 37, which were all  normal and then had a bilirubin initially at 0.6, went up to 1.9 and now  down to 1.3. DIAGNOSTIC DATA:  CT scan, again, revealed evidence of a possible  pancreatic mass and evidence of pancreatitis with inflammation around  the pancreas. MRI performed yesterday, however, seems to confirm SMV  thrombosis, possible portal vein thrombosis, and what was seen on CT of  the pancreas appears to be possibly thrombosed peripancreatic vessel.     ASSESSMENT AND PLAN: Abdominal pain likely due to the SMV thrombosis. My opinion, this is likely what is creating the inflammation around the  pancreas, but I do not believe she truly had pancreatitis as the amylase  and lipase would not support that from a serology standpoint. The  patient has been placed on Lovenox therapeutic. The patient has seen  Dr. Himanshu Stanley in the past for the ITP, he will be consulted. No surgical  intervention is necessary, but the patient will need anticoagulation. DEBBIE VAZQUEZ [facility], MD    D: 08/26/2020 19:40:17       T: 08/26/2020 86:26:49     [email or web address]  Job#: [id number]     Doc#: [id number]    CC:

## 2020-08-27 NOTE — PLAN OF CARE
Problem: Pain:  Goal: Pain level will decrease  Description: Pain level will decrease  8/26/2020 2154 by Noemi Alpers, RN  Outcome: Ongoing  Note: Pain Assessment: 0-10  Pain Level: 7   Patient's Stated Pain Goal: 7   Is pain goal met at this time? No     Non-Pharmaceutical Pain Intervention(s): Repositioned, Rest    PRN tylenol given. Problem: Falls - Risk of:  Goal: Will remain free from falls  Description: Will remain free from falls  8/26/2020 2154 by Noemi Alpers, RN  Outcome: Ongoing  Note: Pt. Remains free from falls at this time. IV infusing per order. RN encouraged pt to call for assistance to BR. Side rails up X2. Call light within reach. S.O. At bedside. RN will continue to provide for a safe environment. Bed alarm on. Problem: Discharge Planning:  Goal: Participates in care planning  Description: Participates in care planning  Outcome: Ongoing  Note: Discharge plan ongoing pending pts labs. Problem: Skin Integrity - Impaired:  Goal: Will show no infection signs and symptoms  Description: Will show no infection signs and symptoms  8/26/2020 2154 by Noemi Alpers, RN  Outcome: Ongoing  Note: No signs or symptoms of infection. Will continue to monitor. Problem: Nutrition Deficit:  Goal: Ability to achieve adequate nutritional intake will improve  Description: Ability to achieve adequate nutritional intake will improve  8/26/2020 2154 by Noemi Alpers, RN  Outcome: Ongoing  Note: Pt advanced to a full liquid diet, tolerating well. Care plan reviewed with patient and . Patient and  verbalize understanding of the plan of care and contribute to goal setting.

## 2020-08-27 NOTE — PROGRESS NOTES
foot surgery in past; now needs lifelong anticoagulation; she will follow-up with Dr. Geoffrey Del Cid  10. Hx ventral hernia with mesh repair   11. Hx cholecystectomy--no significant duct dilation on MRCP 8/25/2020  12. Morbid Obesity with BMI 43.53      Expected discharge date: 1 to 2 days    Disposition:    [x] Home       [] TCU       [] Rehab       [] Psych       [] SNF       [] [place]       [] Other-    Chief Complaint:  Abdominal pain     hospital Course:  per Dr. Fam Cruz progress note from 8/26: Maida Sim is a 44 y.o. female with hx ITP s/p splenectomy, DVT/PE, cholecystectomy presenting with epigastric abd pain. She was recently started on PPI and carafate by PCP but pain worsened and started to radiate to her back thus came to ER for further eval and tx. In the ED, HD stable. Labs unremarkable. PLT 295K. Hb 14.4. WBC 7.9. LFT's elevated, normal lipase. CT A/P showing hypoattenuation within the pancreas could also reflect pancreatic necrosis. Given patient's age pancreatic necrosis is thought to be more likely. Overall pancreas is enlarged. Clinical correlation for etiology of pancreatitis is recommended. Further evaluation may be clinically warranted. 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 Multiple collateral vessels suggest that this is a chronic process. There is also probable thrombosis of a varicose vein superior to the pancreas versus lymph node. -- Due to elevated LFT, abn CT abd GI and general surgery consulted for concern for pancreatic necrosis.   MRCP done 8/25/2020 with multiple findings above\"    8/27--> relates to feeling better, she is tolerating her full liquid diet, she is aware of the need for chronic anticoagulation and she does accept this; up ambulating without any problems; no weight from today however from August 24 August 26 appears she has gained 5 pounds;   eyes and nose reveal she is ahead 3.4 L; IV fluids were stopped yesterday    Subjective (past 24 hours): Overall she states she is feeling much better; is currently rating her epigastric pain 5-6 out of 10; denies any nausea; has tolerated her full liquid diet    Medications:  Reviewed    Infusion Medications    sodium chloride 100 mL/hr at 08/27/20 0252     Scheduled Medications    apixaban  10 mg Oral BID    pantoprazole  40 mg Intravenous QAM AC    sodium chloride flush  10 mL Intravenous 2 times per day     PRN Meds: oxyCODONE **OR** oxyCODONE, sodium chloride flush, acetaminophen **OR** acetaminophen, polyethylene glycol, promethazine **OR** ondansetron, HYDROmorphone      Intake/Output Summary (Last 24 hours) at 8/27/2020 1055  Last data filed at 8/27/2020 0629  Gross per 24 hour   Intake 2555.8 ml   Output 1600 ml   Net 955.8 ml       Diet:  DIET LOW FAT; Exam:  BP (!) 143/82   Pulse 74   Temp 98.2 °F (36.8 °C) (Oral)   Resp 18   Ht 5' 7\" (1.702 m)   Wt 277 lb 14.4 oz (126.1 kg)   SpO2 98%   BMI 43.53 kg/m²     General appearance: No apparent distress, appears stated age and cooperative. HEENT: Pupils equal, round, and reactive to light. Conjunctivae/corneas clear. Neck: Supple, with full range of motion. No jugular venous distention. Trachea midline. Respiratory:  Normal respiratory effort. Clear to auscultation, bilaterally without Rales/Wheezes/Rhonchi. Cardiovascular: Regular rate and rhythm with normal S1/S2 without murmurs, rubs or gallops. Abdomen: Soft, (+) tender to epigastric area, non-distended with normal bowel sounds. Musculoskeletal: passive and active ROM x 4 extremities. Skin: Skin color, texture, turgor normal.    Neurologic:  Neurovascularly intact without any focal sensory/motor deficits.  Cranial nerves: II-XII intact, grossly non-focal.  Psychiatric: Alert and oriented, thought content appropriate  Capillary Refill: Brisk,< 3 seconds   Peripheral Pulses: +2 palpable, equal bilaterally       Labs: Recent Labs     08/25/20  1824 08/26/20  0411 08/27/20  0831   WBC 7.0 7.4 4.9   HGB 12.2 12.9 12.3   HCT 37.1 38.8 37.2    289 312     Recent Labs     08/25/20  0436 08/26/20  0411 08/27/20  0705    137 131*   K 3.9 3.9 5.4*    104 106   CO2 24 23 13*   BUN 11 9 5*   CREATININE 0.5 0.5 0.5   CALCIUM 8.6 8.4* 8.6   PHOS 3.1  --   --      Recent Labs     08/25/20  0436 08/26/20  0411 08/27/20  0705   * 61* 81*   * 111* 104*   BILIDIR 0.6* 0.5*  --    BILITOT 1.3* 0.9 0.9   ALKPHOS 218* 223* 283*     Recent Labs     08/25/20  0436   INR 1.18*     Microbiology:    None    Urinalysis:      Lab Results   Component Value Date    NITRU NEGATIVE 08/24/2020    WBCUA 10-15 08/24/2020    WBCUA 0-2 10/30/2011    BACTERIA MODERATE 08/24/2020    RBCUA 5-10 08/24/2020    BLOODU MODERATE 08/24/2020    SPECGRAV >1.030 08/24/2020    GLUCOSEU NEGATIVE 11/14/2014     DVT prophylaxis: [] Lovenox                                 [] SCDs                                 [] SQ Heparin                                 [] Encourage ambulation           [x] Already on Anticoagulation     Code Status: Full Code    Tele:   [] yes             [x] no    Active Hospital Problems    Diagnosis Date Noted    Abdominal pain [R10.9]     Generalized abdominal pain [R10.84]     Upper abdominal pain [R10.10]     Hyperbilirubinemia [E80.6]     Macrocytosis [D75.89]     History of ITP [Z86.2]     Portal vein thrombosis [I81]     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

## 2020-08-28 ENCOUNTER — TELEPHONE (OUTPATIENT)
Dept: FAMILY MEDICINE CLINIC | Age: 40
End: 2020-08-28

## 2020-08-28 VITALS
WEIGHT: 277.9 LBS | HEIGHT: 67 IN | SYSTOLIC BLOOD PRESSURE: 139 MMHG | TEMPERATURE: 98.4 F | DIASTOLIC BLOOD PRESSURE: 88 MMHG | BODY MASS INDEX: 43.62 KG/M2 | RESPIRATION RATE: 18 BRPM | HEART RATE: 80 BPM | OXYGEN SATURATION: 99 %

## 2020-08-28 LAB
ANA SCREEN: NORMAL
ANTITHROMBIN ACTIVITY: 83 % (ref 76–128)
LIPASE: 25.9 U/L (ref 5.6–51.3)

## 2020-08-28 PROCEDURE — 36415 COLL VENOUS BLD VENIPUNCTURE: CPT

## 2020-08-28 PROCEDURE — 6360000002 HC RX W HCPCS: Performed by: INTERNAL MEDICINE

## 2020-08-28 PROCEDURE — 6370000000 HC RX 637 (ALT 250 FOR IP): Performed by: FAMILY MEDICINE

## 2020-08-28 PROCEDURE — 99239 HOSP IP/OBS DSCHRG MGMT >30: CPT | Performed by: NURSE PRACTITIONER

## 2020-08-28 PROCEDURE — 2580000003 HC RX 258: Performed by: INTERNAL MEDICINE

## 2020-08-28 PROCEDURE — 83690 ASSAY OF LIPASE: CPT

## 2020-08-28 PROCEDURE — 6370000000 HC RX 637 (ALT 250 FOR IP): Performed by: INTERNAL MEDICINE

## 2020-08-28 PROCEDURE — C9113 INJ PANTOPRAZOLE SODIUM, VIA: HCPCS | Performed by: INTERNAL MEDICINE

## 2020-08-28 RX ORDER — OXYCODONE HYDROCHLORIDE 5 MG/1
5 TABLET ORAL EVERY 4 HOURS PRN
Qty: 20 TABLET | Refills: 0 | Status: SHIPPED | OUTPATIENT
Start: 2020-08-28 | End: 2020-09-01

## 2020-08-28 RX ADMIN — Medication 10 ML: at 07:56

## 2020-08-28 RX ADMIN — PANTOPRAZOLE SODIUM 40 MG: 40 INJECTION, POWDER, FOR SOLUTION INTRAVENOUS at 06:14

## 2020-08-28 RX ADMIN — OXYCODONE 10 MG: 5 TABLET ORAL at 00:07

## 2020-08-28 RX ADMIN — Medication 10 ML: at 06:14

## 2020-08-28 RX ADMIN — ACETAMINOPHEN 650 MG: 325 TABLET ORAL at 07:40

## 2020-08-28 RX ADMIN — APIXABAN 10 MG: 5 TABLET, FILM COATED ORAL at 07:54

## 2020-08-28 ASSESSMENT — PAIN - FUNCTIONAL ASSESSMENT: PAIN_FUNCTIONAL_ASSESSMENT: ACTIVITIES ARE NOT PREVENTED

## 2020-08-28 ASSESSMENT — PAIN DESCRIPTION - PROGRESSION

## 2020-08-28 ASSESSMENT — PAIN DESCRIPTION - LOCATION: LOCATION: BACK

## 2020-08-28 ASSESSMENT — PAIN SCALES - GENERAL
PAINLEVEL_OUTOF10: 4
PAINLEVEL_OUTOF10: 7
PAINLEVEL_OUTOF10: 4
PAINLEVEL_OUTOF10: 3

## 2020-08-28 ASSESSMENT — PAIN DESCRIPTION - FREQUENCY: FREQUENCY: CONTINUOUS

## 2020-08-28 ASSESSMENT — PAIN DESCRIPTION - ORIENTATION: ORIENTATION: LOWER

## 2020-08-28 ASSESSMENT — PAIN DESCRIPTION - DESCRIPTORS: DESCRIPTORS: ACHING;CRAMPING

## 2020-08-28 ASSESSMENT — PAIN DESCRIPTION - PAIN TYPE: TYPE: ACUTE PAIN

## 2020-08-28 ASSESSMENT — PAIN DESCRIPTION - ONSET: ONSET: ON-GOING

## 2020-08-28 NOTE — DISCHARGE SUMMARY
Hospital Medicine Discharge Summary      Patient Identification:   Kp Rosenbaum   : 1980  MRN: [id number]   Account: [de-identified]      Patient's PCP: Payton Beauchamp MD    Admit Date: 2020     Discharge Date:   2020    Admitting Physician: Dawna Drake MD     Discharging Nurse Practitioner: ELEAZAR Teague - CNP     Discharge Diagnoses with Assessment/Plan:  2. Possible acute pancreatitis--possibly due to mesenteric vein thrombosis but appear chronic as has collaterals per MRCP 2020; per GI; on low fat diet and tolerating; lipids noted with triglycerides at 83; LEWIS, factor 5 leiden, Antithrombin III, prothrombin are still all pending;  at 19; has had a cholecystectomy in the past; only drinks alcohol on a social basis; needs outpatient follow-up with GIThrombosis of SMV, portal vein thrombosis, residual splenic vein thrombosis, and probable thrombosis of varicose vein superior to pancreas vs lymphadenopathy--acute vs more likely chronic~noted per MRI/MRCP abd 2020; Dr. Rc Fraser recommended starting Eliquis which was started on ; Dr. Rc Fraser is planning a hypercoagulable work-up as outpatient; radiology reports recommends a 1 month follow-up CT/MRI to reevaluate in order has been placed in epic;  LEWIS, factor 5 leiden, Antithrombin III, prothrombin are all pending;  is normal at 19; appreciate surgery input  3. Clustered mass lesions in pancreatic neck--noted per MRI abd 2020;  recommends follow-up MRI or CT in 1 month in order has been placed in [facility] with results to GI  5. Elevated LFT, hyperbilirubinemia--likely due to #1,2 as normal 2020; per GI; hepatitis panel is normal; ALT has trended down however AST is trended up slightly; lipase remains normal  6. Lymphadenopathy along the gastrohepatic ligament, within the periportal region, adjacent to the pancreatic head and within the periceliac region-- noted per MRCP 2020;   7.  Macrocytosis  without anemia--B12 at 1488 and folate greater than 20  8. Hx ITP s/p splenectomy--platelets stable in 925'O  9. Hx prior PE--was on coumadin in past and eliquis for DVT prophylaxis for foot surgery in past; now needs lifelong anticoagulation; she will follow-up with Dr. Jeremy Sabillon  10. Hx ventral hernia with mesh repair   11. Hx cholecystectomy--no significant duct dilation on MRCP 8/25/2020  12. Morbid Obesity with BMI 43.53      The patient was seen and examined on day of discharge and this discharge summary is in conjunction with any daily progress note from day of discharge. Hospital Course:   Everton Leslie is a 44 y.o. female admitted to [address] [place] [address] on 8/24/2020 for abdominal pain; per Dr. Tono Montague progress note from 8/26: \"Judith Choi is a 44 y.o. female with hx ITP s/p splenectomy, DVT/PE, cholecystectomy presenting with epigastric abd pain.  She was recently started on PPI and carafate by PCP but pain worsened and started to radiate to her back thus came to ER for further eval and tx.    In the ED, HD stable. Labs unremarkable. PLT 295K. Hb 14.4. WBC 7.9. LFT's elevated, normal lipase. CT A/P showing hypoattenuation within the pancreas could also reflect pancreatic necrosis. Given patient's age pancreatic necrosis is thought to be more likely. Overall pancreas is enlarged. Clinical correlation for etiology of pancreatitis is recommended. Further evaluation may be clinically warranted. 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 Multiple collateral vessels suggest that this is a chronic process. There is also probable thrombosis of a varicose vein superior to the pancreas versus lymph node.   -- Due to elevated LFT, abn CT abd GI and general surgery consulted for concern for pancreatic necrosis.  MRCP done 8/25/2020 with multiple findings above\"     8/27--> relates to feeling better, she is tolerating her full liquid diet, she is aware of the need for chronic anticoagulation and she does accept this; up ambulating without any problems; no weight from today however from August 24 August 26 appears she has gained 5 pounds;   eyes and nose reveal she is ahead 3.4 L; IV fluids were stopped yesterday    8/28--> patient is currently sitting up in a chair, tolerated her low-fat diet yesterday and this morning without any issues; no signs of bleeding; she states her pain has greatly improved; she is tolerating the Eliquis without any issues; he is aware she needs 10 more doses of the 10 mg twice a day dosing and then decrease that to 5 mg twice a day; 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 Jeremy Sabillon and GI; she feels quite well and is ready to be discharged home; she knows she needs to eat a low-fat bland diet and gradually increase; she is having bowel movements and ambulating in the hallways without any issues. Exam:     Vitals:  Vitals:    08/27/20 1730 08/27/20 2015 08/28/20 0411 08/28/20 0745   BP:  132/85 134/84 139/88   Pulse:  87 76 80   Resp:  16 16 18   Temp:  98.5 °F (36.9 °C) 98.5 °F (36.9 °C) 98.4 °F (36.9 °C)   TempSrc:  Oral Oral Oral   SpO2: 98%   99%   Weight:       Height:         Weight: Weight: 277 lb 14.4 oz (126.1 kg)     24 hour intake/output:    Intake/Output Summary (Last 24 hours) at 8/28/2020 4879  Last data filed at 8/28/2020 5660  Gross per 24 hour   Intake 1460 ml   Output 3000 ml   Net -1540 ml         General appearance:  No apparent distress, appears stated age and cooperative. HEENT:  Normal cephalic, atraumatic without obvious deformity. Pupils equal, round, and reactive to light. Conjunctivae/corneas clear. Neck: Supple, with full range of motion. No jugular venous distention. Trachea midline. Respiratory:  Normal respiratory effort. Clear to auscultation, bilaterally without Rales/Wheezes/Rhonchi.   Cardiovascular:  Regular rate and rhythm with normal S1/S2 without murmurs, rubs or gallops. Abdomen: Soft, mild tenderness epigastric area, non-distended with normal bowel sounds. Musculoskeletal:  No clubbing, cyanosis or edema bilaterally. Full range of motion without deformity. Skin: Skin color, texture, turgor normal.    Neurologic:  Neurovascularly intact without any focal sensory/motor deficits. Cranial nerves: II-XII intact, grossly non-focal.  Psychiatric:  Alert and oriented, thought content appropriate  Capillary Refill: Brisk,< 3 seconds   Peripheral Pulses: +2 palpable, equal bilaterally       Labs: For convenience and continuity at follow-up the following most recent labs are provided:      CBC:    Lab Results   Component Value Date    WBC 4.9 08/27/2020    HGB 12.3 08/27/2020    HCT 37.2 08/27/2020     08/27/2020       Renal:    Lab Results   Component Value Date     08/27/2020    K 5.4 08/27/2020    K 4.2 08/24/2020     08/27/2020    CO2 13 08/27/2020    BUN 5 08/27/2020    CREATININE 0.5 08/27/2020    CALCIUM 8.6 08/27/2020    PHOS 3.1 08/25/2020       Cardiac: No results for input(s): Scarlet Hodgkins in the last 72 hours. Significant Diagnostic Studies    Radiology:   MRI abdomen with and without contrast:  LIVER:   1. The liver is normal size. 2. There is a lobular contour of the liver. The hepatic parenchymal echogenicity is within normal limits. 3. There is persistent altered patchy irregular enhancement within the lateral segment of the left hepatic lobe. There is no well-defined mass lesion. 4. There is mild intrahepatic biliary dilatation. 5. The main, left and right portal veins are extremely atretic however no thrombus is seen within these vessels. The portal venous complex is not visualized. The portal venous confluence is not visualized and therefore probably thrombosed. There is also   thrombosis with the lack of contrast within the superior mesenteric vein.  Etiology of the venous thrombosis is indeterminate however may be related to chronic pancreatitis. There is also thrombosis with the lack of contrast within the residual splenic   vein. In addition, there are numerous venous collaterals most likely related to the thrombosed vasculature.       GALLBLADDER:   1. Cholecystectomy.       BILIARY TREE:   1. The common duct is normal size measuring 0.5 cm in transverse dimension. 2. There is no choledocholithiasis.       PANCREAS:   1. The pancreas is enlarged and edematous with peripancreatic and mesenteric edema which in the right clinical setting is consistent with acute pancreatitis. There are clustered mass lesions within the pancreatic neck which are intermediate signal   intensity on the T2-weighted images and intermediate signal intensity on the fat-sat T1 precontrast images. These lesions do not enhance on the postcontrast images however appear to connect and have a serpiginous appearance and distribution suggesting   the possibility of thrombosed vessels. No additional mass lesion is seen and the pancreatic enhancement is otherwise homogeneous. The pancreatic duct is not dilated and there is no pancreatic divisum.       SPLEEN/ADRENAL GLANDS/KIDNEYS:   1. The patient is had a splenectomy. There is a 1.8 cm mass within the splenic bed suggesting a splenule. 2. The bilateral adrenal glands are unremarkable. 3. There is a moderate amount of mesenteric edema/fluid tracking posteriorly to the left. The bilateral kidneys are otherwise unremarkable.       BOWEL:   1. The bowel gas pattern is nonobstructive.       FREE AIR/FREE FLUID/INFLAMMATION:   1. As previously described.       LYMPHADENOPATHY:   1. Given the extensive venous collaterals is difficult to distinguish these from lymphadenopathy however given this limitation there is lymphadenopathy along the gastrohepatic ligament, within the periportal region, adjacent to the pancreatic head and   within the periceliac region.  Benign and malignant etiologies should be considered.       ABDOMINAL AORTA/IVC: Unremarkable.       LUNG BASES: Unremarkable.       MUSCULOSKELETAL: Unremarkable.       OTHER: None.               Impression   1. There is a lobular contour of the liver. The hepatic parenchymal echogenicity is within normal limits. There is persistent altered patchy irregular enhancement within the lateral segment of the left hepatic lobe. There is no well-defined mass lesion. There is mild intrahepatic biliary dilatation. The main, left and right portal veins are extremely atretic however no thrombus is seen within these vessels. The portal venous complex is not visualized. The portal venous confluence is not visualized and   therefore probably thrombosed. There is also thrombosis with the lack of contrast within the superior mesenteric vein. The etiology of the venous thrombosis is indeterminate however they be related to chronic pancreatitis. There is also thrombosis with   the lack of contrast within the residual splenic vein. In addition, there are numerous venous collaterals most likely related to the thrombosed vasculature. Clinical management is recommended. A follow-up CT examination/MRI examination of the abdomen in   one month is recommended to confirm resolution/stability unless other clinical management is indicated.       2. Cholecystectomy. As previously noted there is mild intrahepatic biliary dilatation however the common duct is normal size measuring 0.5 cm in transverse dimension.       3. The pancreas is enlarged and edematous with peripancreatic and mesenteric edema which in the right clinical setting is consistent with acute pancreatitis. There are clustered mass lesions within the pancreatic neck which are intermediate signal   intensity on the T2-weighted images and intermediate signal intensity on the fat-sat T1 precontrast images.  These lesions do not enhance on the postcontrast images however appear to connect [id number] [id number]  [phone]    In 2 weeks           Discharge Medications:      Karan Louis   Home Medication Instructions CHF:[id number]    Printed on:08/28/20 8696   Medication Information                      apixaban (ELIQUIS) 5 MG TABS tablet  Take 2 tablets by mouth 2 times daily for 10 doses             apixaban (ELIQUIS) 5 MG TABS tablet  Take 1 tablet by mouth 2 times daily Start taking after your 10 mg BID dosing is complete             furosemide (LASIX) 20 MG tablet  Take 1 tablet by mouth daily as needed             omeprazole (PRILOSEC) 40 MG delayed release capsule  Take 1 capsule by mouth daily             oxyCODONE (ROXICODONE) 5 MG immediate release tablet  Take 1 tablet by mouth every 4 hours as needed for Pain for up to 20 doses. Probiotic Product (PROBIOTIC PO)  Take by mouth daily             sucralfate (CARAFATE) 1 GM tablet  Take 1 tablet by mouth 4 times daily                 Time Spent on discharge is more than 45 minutes in the examination, evaluation, counseling and review of medications and discharge plan. Signed: Thank you Gabriel Waller MD for the opportunity to be involved in this patient's care.     Electronically signed by ELEAZAR Perez CNP on 8/28/2020 at 9:28 AM

## 2020-08-28 NOTE — TELEPHONE ENCOUNTER
.Transition of Care visit scheduled.   9/3/2020  Patient is being discharged to home  Date of discharge 08/28/20  Discharge from facility [facility]  Reason for admission acute pancreatitis

## 2020-08-28 NOTE — PROGRESS NOTES
Patient is discharging home with family today. Patient verbalizes understanding of discharge instructions. Chart tore down and placed in yellow bin.

## 2020-08-28 NOTE — ADT AUTH CERT
0.0    Seg Neutrophils: 45.2    Segs Absolute: 2.2    Lymphocytes: 29.9    Monocytes: 20.6    Eosinophils: 3.7    Basophils: 0.4    Immature Grans (Abs): 0.01    Immature Granulocytes: 0.2    Nucleated Red Blood Cells: 0          Per IM    Assessment/Plan:     1. Possible acute pancreatitis--possibly due to mesenteric vein thrombosis but appear chronic as has collaterals per MRCP 8/25/2020; per GI; on FL diet and tolerating so will increase to low fat; lipids noted with triglycerides at 83; LEWIS, factor 5 leiden, Antithrombin III, prothrombin are still all pending;  at 19; has had a cholecystectomy in the past; only drinks alcohol on a social basis     2. 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 Richie Castro recommended starting Eliquis which was started on 8/26; Dr. Richie Castro is planning a hypercoagulable work-up as outpatient; radiology reports recommends a 1 month follow-up CT/MRI to reevaluate;  LEWIS, factor 5 leiden, Antithrombin III, prothrombin are all pending;  is normal at 19; appreciate surgery input    3. Clustered mass lesions in pancreatic neck--noted per MRI abd 8/26/2020;  recommends follow-up MRI or CT in 1 month; await any further GI recommendations    5. Elevated LFT, hyperbilirubinemia--likely due to #1,2 as normal 8/19/2020; per GI; hepatitis panel is normal; ALT has trended down however AST is trended up slightly; lipase remains normal    6. Lymphadenopathy along the gastrohepatic ligament, within the periportal region, adjacent to the pancreatic head and within the periceliac region-- noted per MRCP 8/25/2020; await GI recommendations    7. Macrocytosis  without anemia--B12 at 1488 and folate greater than 20    8. Hx ITP s/p splenectomy--platelets stable in 634'W    9.  Hx prior PE--was on coumadin in past and eliquis for DVT prophylaxis for foot surgery in past; now needs lifelong anticoagulation; she will follow-up with Dr. Selam Leslie    10. Hx ventral hernia with mesh repair     11. Hx cholecystectomy--no significant duct dilation on MRCP 8/25/2020    12. Morbid Obesity with BMI 43.53            Per GI    ASSESSMENT:       1. Pancreatitis per admission note,  but normal lipase 15.7 (was not elevated at admission either). Surgeon believes her pain and inflammation is from [facility] thrombosis and DR Selam Leslie is following. Pt is not ill appearing, and does not look like a pancreatitis patient and did not at time of consultation    2. Abdominal pain-periumbilical     3. Suspected portal vein thrombosis-chronic     4. Elevated LFT's: trending down  (146), AST 61 (110)    5. Elevated Alk phos ; 223    6. ITP; followed with DR Selam Leslie although his note said he has not seen for 3 years    7. Hx splenectomy    8. Hx PE    9. Hx DVT    10. Hx hernia repair with mesh    11. HTN    12. Hx cholecystectomy               PLAN:     1. DR Selam Leslie ; note reviewed.:patient can be investigated for hypercoagulable state as an outpatient and he recommended Eliquis be started    2. Follow labs     3. Antiemetics    4. On Protonix daily     5. DR Gallego notes reviewed. No surgical intervention. 6. Call us as needed. LFT\"s trending down and bilirubin normal today    7.  Recommend F/u MRI of pancreas in 1 month as recommended by radiologist; see pancreatic portion of the MRI               Meds    0.9 % sodium chloride infusion @ 100 ml/h       apixaban (ELIQUIS) tablet 10 mg  bid    pantoprazole (PROTONIX) injection 40 mg   daily       acetaminophen (TYLENOL) tablet 650 mg   q4 prn- used x3    oxyCODONE (ROXICODONE) immediate release tablet 5 mg q4 prn- used x1    oxyCODONE (ROXICODONE) immediate release tablet 10 mg q4 prn- used x1        Pancreatitis - Care Day 3 (8/26/2020) by Abbi Rivero RN         Review Status  Review Entered Completed  8/28/2020 15:21        Criteria Review       Care Day: 3 Care Date: 8/26/2020 Level of Care:    Guideline Day 2    Clinical Status    (X) * Hemodynamic stability    8/28/2020 3:21 PM EDT by Marty Grady      69   136/79    ( ) * Pain absent or reduced    8/28/2020 3:21 PM EDT by Marty Grady      Add po oxycodone prn for pain (pt requesting oral med) and cont dilaudid but limit use    Routes    ( ) Oral diet as tolerated    8/28/2020 3:21 PM EDT by Marty Grady      NPO    Interventions    (X) * NG tube absent    8/28/2020 3:21 PM EDT by Marty Grady      absent    Medications    (X) Parenteral analgesics    8/28/2020 3:21 PM EDT by Marty Grady      po oxycodone prn and iv dilaudid    * Milestone    Additional Notes    8/26        Vs- 98.7 (37.1)   18   69   136/79   94% RA       Labs    8/26/2020 04:11    Sodium: 137    Potassium: 3.9    Chloride: 104    CO2: 23    BUN: 9    Creatinine: 0.5    Anion Gap: 10.0    Est, Glom Filt Rate: >90    Glucose: 65 (L)    Calcium: 8.4 (L)    Total Protein: 6.4    Albumin: 3.3 (L)    Alk Phos: 223 (H)    ALT: 111 (H)    AST: 61 (H)    Bilirubin: 0.9    Bilirubin, Direct: 0.5 (H)    Lipase: 19.8    WBC: 7.4    RBC: 3.79 (L)    Hemoglobin Quant: 12.9    Hematocrit: 38.8    MCV: 102.4 (H)    MCH: 34.0 (H)    MCHC: 33.2    MPV: 10.0    RDW-CV: 12.8    RDW-SD: 48.0 (H)    Platelet Count: 256    : 19       8/26/2020 12:51    Heparin LMW : 0.75          Per General Surgeon    Subjective: patient continues to have some abdominal pain but is getting better. Denies N&V and no bowel function, she is still NPO until GI sees. Assessment:    1. ? Pancreatitis  depsite normal amylase and lipase    2. SMV/PV thrombosis    3. Abnormal LFT    4. Morbid obesity    5. HX PE,    6. Surgical History, cholecystectomy and hernia repair with mesh       Plan:    1. Conservative treatment    2. Bowel Rest    3. GI on board    4. Oncology following    5.  IV hydration    6. Analgesia and antiemetics as needed    7. therapeutic Lovenox    8. Monitor Labs    9. No surgical intervention necessary, surgery signing off, call if needed            Per IM    -- 8/26 --> apprec consultants - cont NPO, IVF but decrease rate due to swelling and wt trending up.  Add po oxycodone prn for pain (pt requesting oral med) and cont dilaudid but limit use.  Awaiting Gi recs for further diet.  LFT trending down - cont monitor;  D/w Dr. Gianna Lechuga about anticoag plan as on therapeutic lovenox currently -> recommends eliquis at d/c - pt agreeable thus started this pm and lovenox stopped;  Cont monitor lytes, renal fxn, LFT, BP, fluid status, pain, CBC.           Assessment/Plan:     1. Acute pancreatitis -- ?due to mesenteric vein thrombosis but appear chronic as has collaterals per MRCP 8/25/2020 --> apprec GI assist -- lipase normal on admission 8/14 and again 8/25 and 8/26 but CLINICAL picture and MRI findings 8/25/2020 of pancreas appearing inflamed with enlarged and edematous pancreas and peripancreatic/mesenteric edema treating as acute pancreatitis    -- cont NPO, IVF but decrease to 100 mL/hr 8/26->    -- add po oxycodone 5-10 mg q 4 hrs prn pain, cont prn dilaudid for severe pain and monitor pain med use closely    -- no obstructive process noted per MRI abd 8/25/2020-  Hx cholecystectomy    -- Denies any significant ETOH use    -- Not on any medications to cause    -- check lipids 8/27    -- Dr. Jose Rod ordered LEWIS, factor 5 leiden, Antithrombin III, prothrombin,  on 8/25 and (p) as of 8/26/2020    2.  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 Gianna Lechuga -> d/w Dr. Gianna Lechuga 8/26 plans for [address] -> due to prior hx of clots (PE) and now current thrombosis no matter ligament, within the periportal region, adjacent to the pancreatic head and within the periceliac region -- noted per MRCP 8/25/2020 - notes \"benign and malignant etiologies should be considered\" -- await further GI recs - ? EUS - ?bx vs f/u CTP/MRI -- await recs    7. Macrocytosis -- likely related to splenectomy? -- check B12/folate 8/27    8. Hx ITP s/p splenectomy -- plts stable in 200's    9. Hx prior PE -- was on coumadin in past and eliquis for DVT prophylaxis for foot surgery in past -- see #2 -- no signs of PE or DVT. 10. Hx ventral hernia with mesh repair -- apprec Dr. Junior cao - not a current issue. 6. Hx cholecystectomy -- no significant duct dilation on MRCP 8/25/2020    12. Morbid Obesity Body mass index is 43.53 kg/m². Per Hem/Onc    Reviewed case.  Whether surgery thinks the patient cannot have pancreatitis because the lipase is not elevated or not, she has pancreatic inflammation.  She also has what looks like a pseudocyst.  Her clotting did not cause acute symptoms if it is chronic.  She has not seen me for 3 years, and I never saw her for a clotting disorder.  I saw her for pancytopenia and ITP, both of which resolved after splenectomy years ago.      The patient can be investigated for hypercoagulable state as an outpatient.  The fact remains that she had a history of a PE, of which I was never involved in her treatment. She was on Eliquis then. Kathy Santos has developed a second episode of clotting in her lifetime, so she requires lifelong anticoagulation, whether she has a heritable cause or not. Spoke with Dr Gwendlyn Denver today and answered Dr Suman Olivares yesterday to start Eliquis again. Lorena Saunders already placed her on Lovenox at therapeutic dosing since date of admission.   Can restart Eliquis when next Lovenox dose due and transition her.  Will follow as an outpatient from here. Alex Anand pancreas/pancreatitis issue can be addressed by GI service and/or surgery service. Meds    0.9 % sodium chloride infusion @ 100 ml/h       apixaban (ELIQUIS) tablet 10 mg  bid    pantoprazole (PROTONIX) injection 40 mg   daily       acetaminophen (TYLENOL) tablet 650 mg   q4 prn- used x2       HYDROmorphone (DILAUDID) injection 0.5 mg   q3 prn- used x2       oxyCODONE (ROXICODONE) immediate release tablet 10 mg q4 prn- used x3

## 2020-08-28 NOTE — TELEPHONE ENCOUNTER
[facility] Transitions Initial Follow Up Call    Outreach made within 2 business days of discharge: Yes    Patient: Sury Chicas Patient : 1980   MRN: [id number]  Reason for Admission: Acute Pancreatitis    Discharge Date: 20       Spoke with:Left message for patient to call back    Discharge department/facility: [facility]    Scheduled appointment with PCP within 7-14 days    Follow Up  Future Appointments   Date Time Provider Ken Hogue   9/3/2020 12:20 PM Юлия Diaz MD [facility] - Herb Galvez CMA ([address] [place] [address])

## 2020-08-28 NOTE — PLAN OF CARE
Problem: Pain:  Goal: Pain level will decrease  Description: Pain level will decrease  8/26/2020 2154 by Nilam Velarde RN  Outcome: Ongoing  Note: Pain Assessment: 0-10  Pain Level: 7   Patient's Stated Pain Goal: 7   Is pain goal met at this time? No     Non-Pharmaceutical Pain Intervention(s): Repositioned, Rest    PRN tylenol and marquita given. Problem: Falls - Risk of:  Goal: Will remain free from falls  Description: Will remain free from falls  8/26/2020 2154 by Nilam Velarde RN  Outcome: Ongoing  Note: Pt. Remains free from falls at this time. IV infusing per order. RN encouraged pt to call for assistance to BR. Side rails up X2. Call light within reach. S.O. At bedside. RN will continue to provide for a safe environment. Bed alarm on. Problem: Discharge Planning:  Goal: Participates in care planning  Description: Participates in care planning  Outcome: Ongoing  Note: Discharge plan ongoing pending pts labs. Problem: Skin Integrity - Impaired:  Goal: Will show no infection signs and symptoms  Description: Will show no infection signs and symptoms  8/26/2020 2154 by Nilam Velarde RN  Outcome: Ongoing  Note: No signs or symptoms of infection. Will continue to monitor. Problem: Nutrition Deficit:  Goal: Ability to achieve adequate nutritional intake will improve  Description: Ability to achieve adequate nutritional intake will improve  8/26/2020 2154 by Nilam Velarde RN  Outcome: Ongoing  Note: Pt advancing diet, tolerating well. Care plan reviewed with patient and . Patient and  verbalize understanding of the plan of care and contribute to goal setting.

## 2020-08-28 NOTE — CARE COORDINATION
Patient is discharged to home today with no services added/requested. 8/28/20, 9:01 AM EDT    Patient goals/plan/ treatment preferences discussed by  and . Patient goals/plan/ treatment preferences reviewed with patient/ family. Patient/ family verbalize understanding of discharge plan and are in agreement with goal/plan/treatment preferences. Understanding was demonstrated using the teach back method. AVS provided by RN at time of discharge, which includes all necessary medical information pertaining to the patients current course of illness, treatment, post-discharge goals of care, and treatment preferences.

## 2020-09-03 ASSESSMENT — ENCOUNTER SYMPTOMS
VOMITING: 0
SHORTNESS OF BREATH: 0
NAUSEA: 0
SORE THROAT: 0
EYE REDNESS: 0
COUGH: 0
RHINORRHEA: 0

## 2020-09-03 NOTE — PROGRESS NOTES
[address] [place] [address] [address], [address]  Phone:  [phone]  Fax:  [phone]      [facility] or Hospital Follow Up      Arlin Barnett   YOB: 1980    Chief Complaint:     Chief Complaint   Patient presents with    Follow-Up from Hospital     blood clot in the pancreas     Transition of Care:     Date of Office Visit:  9/4/2020  Date of Hospital Admission: 8/24/20  Date of Hospital Discharge: 8/28/20  Readmission Risk Score(high >=14%. Medium >=10%):Readmission Risk Score: 8    Care management risk score Rising risk (score 2-5) and Complex Care (Scores >=6): 1     Non face to face  following discharge, date last encounter closed (first attempt may have been earlier): 8/28/2020  2:48 PM 8/28/2020  2:48 PM    Call initiated 2 business days of discharge: Yes     Inpatient course: Discharge summary reviewed- see chart. Interval history/Current status:  See below. Past Medical History:     Patient Active Problem List   Diagnosis    Hypertension    GERD (gastroesophageal reflux disease)    Thrombocytopenia (HCC)    History of splenectomy    Ventral hernia without obstruction or gangrene    Closed nondisplaced fracture of fifth right metatarsal bone    High ankle sprain of right lower extremity    Acute pancreatitis    Acute pancreatitis without infection or necrosis    Portal vein thrombosis    Mesenteric vein thrombosis    Abnormal LFTs    Morbid obesity (HCC)    History of pulmonary embolism    Abdominal pain    Generalized abdominal pain    Upper abdominal pain    Hyperbilirubinemia    Macrocytosis    History of ITP       Allergies:       Allergies   Allergen Reactions    Ceclor [Cefaclor] Rash       Medications:     Medications listed as ordered at the time of discharge from hospital   [facility] Medication Instructions DIANA:    Printed on:09/04/20 0239   Medication Information apixaban (ELIQUIS) 5 MG TABS tablet  Take 1 tablet by mouth 2 times daily Start taking after your 10 mg BID dosing is complete             omeprazole (PRILOSEC) 40 MG delayed release capsule  Take 1 capsule by mouth daily             Probiotic Product (PROBIOTIC PO)  Take by mouth daily                 Medications marked \"taking\" at this time  Outpatient Medications Marked as Taking for the 9/4/20 encounter (Office Visit) with Taylor Hodges MD   Medication Sig Dispense Refill    omeprazole (PRILOSEC) 40 MG delayed release capsule Take 1 capsule by mouth daily 90 capsule 1    apixaban (ELIQUIS) 5 MG TABS tablet Take 1 tablet by mouth 2 times daily Start taking after your 10 mg BID dosing is complete 60 tablet 1    Probiotic Product (PROBIOTIC PO) Take by mouth daily        Medications patient taking as of now reconciled against medications ordered at time of hospital discharge: Yes      History of Present Illness:     Mario Kulkarni is a 43 yo female who presents today for transition of care. She was hospitalized at [facility] from 8/24/2020-8/28/2020. Hospital records, labs, and imaging were reviewed and are summarized below. On 8/24/2020 she presented to the ER for epigastric abdominal pain. She was recently started on PPI and Carafate but pain worsened and started to radiate to her back thus she went to the ER. There her LFT's were elevated but her lipase was WNL. CT abdomen/pelvis revealed hypoattenuation within the pancreas which could reflect pancreatic necrosis. Her pancreas appeared enlarged. Mention was made of probable thrombosis of the superior mesenteric vein as well as part of the portal vein. GI and general surgery were consulted for concern for pancreatic necrosis.  She had an MRCP on 8/25/2020. By 8/27 she was tolerating a full liquid diet. By 8/28 she was tolerating a low-fat diet. She was advised that she will need chronic anticoagulation because of her findings. Since being home, she's doing well. Her abdominal pain has resolved. She's eating a bland low-fat diet. Her kids are showing hogs at the [employer] this weekend but she's planning on not drinking alcohol. She has been taking her Eliquis and has been doing well on it. She is worried about possibly throwing another clot, and wants to know if what she has is genetic as her 13 yo daughter is on OCPs. She has an appointment with Dr. Sb Lopez on 9/17. Review of Systems:     Review of Systems   Constitutional: Negative for chills and fever. HENT: Negative for rhinorrhea and sore throat. Eyes: Negative for redness and visual disturbance. Respiratory: Negative for cough and shortness of breath. Cardiovascular: Negative for chest pain and palpitations. Gastrointestinal: Negative for nausea and vomiting. Genitourinary: Negative for dysuria and frequency. Musculoskeletal: Negative for arthralgias and myalgias. Neurological: Negative for weakness and headaches. Hematological: Bruises/bleeds easily. Psychiatric/Behavioral: Negative for decreased concentration and dysphoric mood. Physical Exam:     Vitals:    09/04/20 0901   BP: 124/80   Site: Left Upper Arm   Position: Sitting   Cuff Size: Large Adult   Pulse: 76   Temp: 97.9 °F (36.6 °C)   SpO2: 98%   Weight: 269 lb (122 kg)   Height: 5' 7\" (1.702 m)     Body mass index is 42.13 kg/m². Wt Readings from Last 3 Encounters:   09/04/20 269 lb (122 kg)   08/26/20 277 lb 14.4 oz (126.1 kg)   08/19/20 276 lb 12.8 oz (125.6 kg)     BP Readings from Last 3 Encounters:   09/04/20 124/80   08/28/20 139/88   08/19/20 132/82     Physical Exam  Vitals signs and nursing note reviewed. Constitutional:       General: She is not in acute distress. Appearance: She is well-developed. HENT:      Head: Normocephalic and atraumatic.       Nose: Nose normal.   Eyes:      Conjunctiva/sclera: Conjunctivae normal.   Neck:      Musculoskeletal: Normal range of motion and neck supple. Cardiovascular:      Rate and Rhythm: Normal rate and regular rhythm. Heart sounds: Normal heart sounds. Pulmonary:      Effort: Pulmonary effort is normal. No respiratory distress. Breath sounds: Normal breath sounds. No wheezing. Abdominal:      General: Bowel sounds are normal. There is no distension. Palpations: Abdomen is soft. Tenderness: There is no abdominal tenderness. Skin:     General: Skin is warm and dry. Findings: No erythema or rash. Neurological:      Mental Status: She is alert and oriented to person, place, and time. Psychiatric:         Behavior: Behavior normal.       Assessment/Plan:     1. Hospital discharge follow-up  [facility] records, labs, and imaging were reviewed and are summarized above. 2. Acute pancreatitis with uninfected necrosis, unspecified pancreatitis type  - This was possibly due to mesenteric vein thrombosis. She will have outpatient follow-up with GI for thrombosis of SMV, portal vein thrombosis, residual splenic vein thrombosis, and probable thrombosis of varicose vein superior to pancreas vs lymphadenopathy. Dr. Elise Hernandez recommended starting Eliquis which was started on 8/26; he is planning a hypercoagulable work-up as outpatient. Radiology recommended a 1 month follow-up CT/MRI to reevaluate . She was advised on a low-fat diet. 3. Portal vein thrombosis  4. Mesenteric vein thrombosis  - She will have outpatient follow-up with GI for thrombosis of SMV, portal vein thrombosis, residual splenic vein thrombosis, and probable thrombosis of varicose vein superior to pancreas vs lymphadenopathy. Dr. Elise Hernandez recommended starting Eliquis which was started on 8/26; he is planning a hypercoagulable work-up as outpatient. Radiology recommended a 1 month follow-up CT/MRI to reevaluate . 5. Lesion of pancreas  - These were noted per MRI abdomen on 8/26/2020.   She will need follow-up MRI or CT in 1 month in order has been placed in [facility] with results to GI      Medical Decision Making: high complexity      Electronically signed by Lala Bradshaw MD on 9/4/2020.

## 2020-09-03 NOTE — ADT AUTH CERT
0.0    Seg Neutrophils: 45.2    Segs Absolute: 2.2    Lymphocytes: 29.9    Monocytes: 20.6    Eosinophils: 3.7    Basophils: 0.4    Immature Grans (Abs): 0.01    Immature Granulocytes: 0.2    Nucleated Red Blood Cells: 0          Per IM    Assessment/Plan:     1. Possible acute pancreatitis--possibly due to mesenteric vein thrombosis but appear chronic as has collaterals per MRCP 8/25/2020; per GI; on FL diet and tolerating so will increase to low fat; lipids noted with triglycerides at 83; LEWIS, factor 5 leiden, Antithrombin III, prothrombin are still all pending;  at 19; has had a cholecystectomy in the past; only drinks alcohol on a social basis     2. 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 Velvet Rae recommended starting Eliquis which was started on 8/26; Dr. Velvet Rae is planning a hypercoagulable work-up as outpatient; radiology reports recommends a 1 month follow-up CT/MRI to reevaluate;  LEWIS, factor 5 leiden, Antithrombin III, prothrombin are all pending;  is normal at 19; appreciate surgery input    3. Clustered mass lesions in pancreatic neck--noted per MRI abd 8/26/2020;  recommends follow-up MRI or CT in 1 month; await any further GI recommendations    5. Elevated LFT, hyperbilirubinemia--likely due to #1,2 as normal 8/19/2020; per GI; hepatitis panel is normal; ALT has trended down however AST is trended up slightly; lipase remains normal    6. Lymphadenopathy along the gastrohepatic ligament, within the periportal region, adjacent to the pancreatic head and within the periceliac region-- noted per MRCP 8/25/2020; await GI recommendations    7. Macrocytosis  without anemia--B12 at 1488 and folate greater than 20    8. Hx ITP s/p splenectomy--platelets stable in 299'M    9.  Hx prior PE--was on coumadin in past and eliquis for DVT prophylaxis for foot surgery in past; now needs lifelong anticoagulation; she will follow-up with Dr. Darryl Henry    10. Hx ventral hernia with mesh repair     11. Hx cholecystectomy--no significant duct dilation on MRCP 8/25/2020    12. Morbid Obesity with BMI 43.53            Per GI    ASSESSMENT:       1. Pancreatitis per admission note,  but normal lipase 15.7 (was not elevated at admission either). Surgeon believes her pain and inflammation is from [facility] thrombosis and DR Darryl Henry is following. Pt is not ill appearing, and does not look like a pancreatitis patient and did not at time of consultation    2. Abdominal pain-periumbilical     3. Suspected portal vein thrombosis-chronic     4. Elevated LFT's: trending down  (146), AST 61 (110)    5. Elevated Alk phos ; 223    6. ITP; followed with DR Darryl Henry although his note said he has not seen for 3 years    7. Hx splenectomy    8. Hx PE    9. Hx DVT    10. Hx hernia repair with mesh    11. HTN    12. Hx cholecystectomy               PLAN:     1. DR Darryl Henry ; note reviewed.:patient can be investigated for hypercoagulable state as an outpatient and he recommended Eliquis be started    2. Follow labs     3. Antiemetics    4. On Protonix daily     5. DR Gallego notes reviewed. No surgical intervention. 6. Call us as needed. LFT\"s trending down and bilirubin normal today    7.  Recommend F/u MRI of pancreas in 1 month as recommended by radiologist; see pancreatic portion of the MRI               Meds    0.9 % sodium chloride infusion @ 100 ml/h       apixaban (ELIQUIS) tablet 10 mg  bid    pantoprazole (PROTONIX) injection 40 mg   daily       acetaminophen (TYLENOL) tablet 650 mg   q4 prn- used x3    oxyCODONE (ROXICODONE) immediate release tablet 5 mg q4 prn- used x1    oxyCODONE (ROXICODONE) immediate release tablet 10 mg q4 prn- used x1        Pancreatitis - Care Day 3 (8/26/2020) by Mark Aguirre RN         Review Status  Review Entered Completed  8/28/2020 15:21        Criteria Review       Care Day: 3 Care Date: 8/26/2020 Level of Care:    Guideline Day 2    Clinical Status    (X) * Hemodynamic stability    8/28/2020 3:21 PM EDT by Dwight Mancini      69   136/79    ( ) * Pain absent or reduced    8/28/2020 3:21 PM EDT by Dwight Mancini      Add po oxycodone prn for pain (pt requesting oral med) and cont dilaudid but limit use    Routes    ( ) Oral diet as tolerated    8/28/2020 3:21 PM EDT by Dwight Mancini      NPO    Interventions    (X) * NG tube absent    8/28/2020 3:21 PM EDT by Dwight Mancini      absent    Medications    (X) Parenteral analgesics    8/28/2020 3:21 PM EDT by Dwight Mancini      po oxycodone prn and iv dilaudid    * Milestone    Additional Notes    8/26        Vs- 98.7 (37.1)   18   69   136/79   94% RA       Labs    8/26/2020 04:11    Sodium: 137    Potassium: 3.9    Chloride: 104    CO2: 23    BUN: 9    Creatinine: 0.5    Anion Gap: 10.0    Est, Glom Filt Rate: >90    Glucose: 65 (L)    Calcium: 8.4 (L)    Total Protein: 6.4    Albumin: 3.3 (L)    Alk Phos: 223 (H)    ALT: 111 (H)    AST: 61 (H)    Bilirubin: 0.9    Bilirubin, Direct: 0.5 (H)    Lipase: 19.8    WBC: 7.4    RBC: 3.79 (L)    Hemoglobin Quant: 12.9    Hematocrit: 38.8    MCV: 102.4 (H)    MCH: 34.0 (H)    MCHC: 33.2    MPV: 10.0    RDW-CV: 12.8    RDW-SD: 48.0 (H)    Platelet Count: 837    : 19       8/26/2020 12:51    Heparin LMW : 0.75          Per General Surgeon    Subjective: patient continues to have some abdominal pain but is getting better. Denies N&V and no bowel function, she is still NPO until GI sees. Assessment:    1. ? Pancreatitis  depsite normal amylase and lipase    2. SMV/PV thrombosis    3. Abnormal LFT    4. Morbid obesity    5. HX PE,    6. Surgical History, cholecystectomy and hernia repair with mesh       Plan:    1. Conservative treatment    2. Bowel Rest    3. GI on board    4. Oncology following    5.  IV if chronic or acute this would indicate a 2nd clot and thus needs lifelong anticoagulation -> he recommended starting eliquis (pt has been on previously) thus started 10 mg bid 8/26 x 1 week then will need 5 mg bid.  Pt educated and agrees to tx.    -- hypercoag w/u as outpt per Dr. Simeon Light    -- per MRI reading rec f/u CT/MRI in 1 mo to re-evaluate    -- Dr. Nigel Loznao ordered LEWIS, factor 5 leiden, Antithrombin III, prothrombin,  on 8/25 and (p) as of 8/26/2020    -- liver u/s 8/24/2020 with Suboptimal evaluation of the hepatic vasculature due to multiple collateral vessels and likely chronic thrombosis of the portal vein    3. Upper abd pain -- pt clinically looks and acts like acute pancreatitis --> see tx as above --  pain did not improve with PPI/carafate as outpt thus less likely PUD    4. 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 These lesions do not enhance on the postcontrast images however appear to connect and have a serpiginous appearance and distribution suggesting    the possibility of thrombosed vessels                -- recommends f/u MRI or CT in 1 mo                -- await any further GI recs -> ?EUS/?bx vs f/u MRI/CT    5. Elevated LFT, hyperbilirubinemia -- likely due to #1,2 as normal 8/19/2020  -- GI following - improving slightly each day -> on 8/26 = AST 61 (216 on admission),  (205), alk phos 223 (251), bili 0.9 (1.9) --> cont trend -- avoid APAP for now --per MRI abd 8/25 = lobular contour of the liver. The hepatic parenchymal echogenicity is within normal limits    -- check hepatitis panel 8/27    -- Dr. Nigel Lozano ordered LEWIS, factor 5 leiden, Antithrombin III, prothrombin,  on 8/25 and (p) as of 8/26/2020    6.  Lymphadenopathy along the gastrohepatic ligament, within the periportal region, adjacent to the pancreatic head and within the periceliac region -- noted per MRCP 8/25/2020 - notes \"benign and malignant etiologies should be considered\" -- await further GI recs - ? EUS - ?bx vs f/u CTP/MRI -- await recs    7. Macrocytosis -- likely related to splenectomy? -- check B12/folate 8/27    8. Hx ITP s/p splenectomy -- plts stable in 200's    9. Hx prior PE -- was on coumadin in past and eliquis for DVT prophylaxis for foot surgery in past -- see #2 -- no signs of PE or DVT. 10. Hx ventral hernia with mesh repair -- apprec Dr. Mauri cao - not a current issue. 6. Hx cholecystectomy -- no significant duct dilation on MRCP 8/25/2020    12. Morbid Obesity Body mass index is 43.53 kg/m². Per Hem/Onc    Reviewed case.  Whether surgery thinks the patient cannot have pancreatitis because the lipase is not elevated or not, she has pancreatic inflammation.  She also has what looks like a pseudocyst.  Her clotting did not cause acute symptoms if it is chronic.  She has not seen me for 3 years, and I never saw her for a clotting disorder.  I saw her for pancytopenia and ITP, both of which resolved after splenectomy years ago.      The patient can be investigated for hypercoagulable state as an outpatient.  The fact remains that she had a history of a PE, of which I was never involved in her treatment. She was on Eliquis then. [place] has developed a second episode of clotting in her lifetime, so she requires lifelong anticoagulation, whether she has a heritable cause or not. Spoke with Dr Comfort Stuart today and answered Dr Molly Krause yesterday to start Eliquis again. Anna Fitzpatrick already placed her on Lovenox at therapeutic dosing since date of admission.   Can restart Eliquis when next Lovenox dose due and transition her.  Will follow as an outpatient from here. Yvan Mayes pancreas/pancreatitis issue can be addressed by GI service and/or surgery service. Meds    0.9 % sodium chloride infusion @ 100 ml/h       apixaban (ELIQUIS) tablet 10 mg  bid    pantoprazole (PROTONIX) injection 40 mg   daily       acetaminophen (TYLENOL) tablet 650 mg   q4 prn- used x2       HYDROmorphone (DILAUDID) injection 0.5 mg   q3 prn- used x2       oxyCODONE (ROXICODONE) immediate release tablet 10 mg q4 prn- used x3

## 2020-09-04 ENCOUNTER — OFFICE VISIT (OUTPATIENT)
Dept: FAMILY MEDICINE CLINIC | Age: 40
End: 2020-09-04
Payer: COMMERCIAL

## 2020-09-04 VITALS
DIASTOLIC BLOOD PRESSURE: 80 MMHG | BODY MASS INDEX: 42.22 KG/M2 | TEMPERATURE: 97.9 F | WEIGHT: 269 LBS | HEART RATE: 76 BPM | HEIGHT: 67 IN | OXYGEN SATURATION: 98 % | SYSTOLIC BLOOD PRESSURE: 124 MMHG

## 2020-09-04 PROCEDURE — 99496 TRANSJ CARE MGMT HIGH F2F 7D: CPT | Performed by: FAMILY MEDICINE

## 2020-09-04 PROCEDURE — 1111F DSCHRG MED/CURRENT MED MERGE: CPT | Performed by: FAMILY MEDICINE

## 2020-09-04 RX ORDER — OMEPRAZOLE 40 MG/1
40 CAPSULE, DELAYED RELEASE ORAL DAILY
Qty: 90 CAPSULE | Refills: 1 | Status: SHIPPED | OUTPATIENT
Start: 2020-09-04

## 2020-09-04 ASSESSMENT — PATIENT HEALTH QUESTIONNAIRE - PHQ9
SUM OF ALL RESPONSES TO PHQ QUESTIONS 1-9: 0
SUM OF ALL RESPONSES TO PHQ9 QUESTIONS 1 & 2: 0
2. FEELING DOWN, DEPRESSED OR HOPELESS: 0
1. LITTLE INTEREST OR PLEASURE IN DOING THINGS: 0
SUM OF ALL RESPONSES TO PHQ QUESTIONS 1-9: 0

## 2020-09-17 NOTE — ADT AUTH CERT
Pancreatitis - Care Day 4 (8/27/2020) by Mark Aguirre RN         Review Entered  Review Status    8/28/2020 15:23  Completed        Criteria Review       Care Day: 4 Care Date: 8/27/2020 Level of Care:    Guideline Day 2    Clinical Status    (X) * Hemodynamic stability    8/28/2020 3:23 PM EDT by Lavada Cockayne      BP (!) 143/82   Pulse 74    ( ) * Pain absent or reduced    8/28/2020 3:23 PM EDT by Lavada Cockayne      currently rating her epigastric pain 5-6 out of 10    Activity    (X) Activity as tolerated    8/28/2020 3:23 PM EDT by Lavada Cockayne      aat    Routes    (X) Oral diet as tolerated    8/28/2020 3:23 PM EDT by Lavada Cockayne      tolerating her full liquid diet    Interventions    (X) * NG tube absent    8/28/2020 3:23 PM EDT by Lavada Cockayne      absent    Medications    (X) Parenteral analgesics    8/28/2020 3:23 PM EDT by Lavada Cockayne      po oxycodone, po tylenol    * Milestone    Additional Notes    8/27        Vs-  98.2 (36.8)   18   74   143/82  98% RA       Labs    8/27/2020 07:05    Sodium: 131 (L)    Potassium: 5.4 (H)    Chloride: 106    CO2: 13 (L)    BUN: 5 (L)    Creatinine: 0.5    Anion Gap: 12.0    Est, Glom Filt Rate: >90    Glucose: 77    Calcium: 8.6    Total Protein: 6.9    Cholesterol, Total: 157    HDL Cholesterol: 44    LDL Calculated: 96    Triglycerides: 83    Albumin: 2.1 (L)    Alk Phos: 283 (H)    ALT: 104 (H)    AST: 81 (H)    Bilirubin: 0.9    Lipase: 28.5    TSH: 2.200    Folate: > 20.0    Vitamin B-12: 1488 (H)    Hep A IgM: Negative    Hepatitis B Surface Ag: Negative    Hepatitis C Ab:  Negative    Hep B Core Ab, IgM: Negative       8/27/2020 08:31    WBC: 4.9    RBC: 3.70 (L)    Hemoglobin Quant: 12.3    Hematocrit: 37.2    MCV: 100.5 (H)    MCH: 33.2 (H)    MCHC: 33.1    MPV: 10.2    RDW-CV: 12.5    RDW-SD: 46.6 (H)    Platelet Count: 674    Lymphocytes Absolute: 1.5    Monocytes Absolute: 1.0    Eosinophils Absolute: 0.2    Basophils Absolute: 0.0    Seg Neutrophils: 45.2    Segs Absolute: 2.2    Lymphocytes: 29.9    Monocytes: 20.6    Eosinophils: 3.7    Basophils: 0.4    Immature Grans (Abs): 0.01    Immature Granulocytes: 0.2    Nucleated Red Blood Cells: 0          Per IM    Assessment/Plan:     1. Possible acute pancreatitis--possibly due to mesenteric vein thrombosis but appear chronic as has collaterals per MRCP 8/25/2020; per GI; on FL diet and tolerating so will increase to low fat; lipids noted with triglycerides at 83; LEWIS, factor 5 leiden, Antithrombin III, prothrombin are still all pending;  at 19; has had a cholecystectomy in the past; only drinks alcohol on a social basis     2. 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 Roseline Romo recommended starting Eliquis which was started on 8/26; Dr. Roseline Romo is planning a hypercoagulable work-up as outpatient; radiology reports recommends a 1 month follow-up CT/MRI to reevaluate;  LEWIS, factor 5 leiden, Antithrombin III, prothrombin are all pending;  is normal at 19; appreciate surgery input    3. Clustered mass lesions in pancreatic neck--noted per MRI abd 8/26/2020;  recommends follow-up MRI or CT in 1 month; await any further GI recommendations    5. Elevated LFT, hyperbilirubinemia--likely due to #1,2 as normal 8/19/2020; per GI; hepatitis panel is normal; ALT has trended down however AST is trended up slightly; lipase remains normal    6. Lymphadenopathy along the gastrohepatic ligament, within the periportal region, adjacent to the pancreatic head and within the periceliac region-- noted per MRCP 8/25/2020; await GI recommendations    7. Macrocytosis  without anemia--B12 at 1488 and folate greater than 20    8. Hx ITP s/p splenectomy--platelets stable in 846'B    9.  Hx prior PE--was on coumadin in past and eliquis for DVT prophylaxis for foot surgery in past; now needs lifelong anticoagulation; she will follow-up with Dr. Delgado    10. Hx ventral hernia with mesh repair     11. Hx cholecystectomy--no significant duct dilation on MRCP 8/25/2020    12. Morbid Obesity with BMI 43.53            Per GI    ASSESSMENT:       1. Pancreatitis per admission note,  but normal lipase 15.7 (was not elevated at admission either). Surgeon believes her pain and inflammation is from [facility] thrombosis and DR Delgado is following. Pt is not ill appearing, and does not look like a pancreatitis patient and did not at time of consultation    2. Abdominal pain-periumbilical     3. Suspected portal vein thrombosis-chronic     4. Elevated LFT's: trending down  (146), AST 61 (110)    5. Elevated Alk phos ; 223    6. ITP; followed with DR Delgado although his note said he has not seen for 3 years    7. Hx splenectomy    8. Hx PE    9. Hx DVT    10. Hx hernia repair with mesh    11. HTN    12. Hx cholecystectomy               PLAN:     1. DR Delgado ; note reviewed.:patient can be investigated for hypercoagulable state as an outpatient and he recommended Eliquis be started    2. Follow labs     3. Antiemetics    4. On Protonix daily     5. DR Gallego notes reviewed. No surgical intervention. 6. Call us as needed. LFT\"s trending down and bilirubin normal today    7.  Recommend F/u MRI of pancreas in 1 month as recommended by radiologist; see pancreatic portion of the MRI               Meds    0.9 % sodium chloride infusion @ 100 ml/h       apixaban (ELIQUIS) tablet 10 mg  bid    pantoprazole (PROTONIX) injection 40 mg   daily       acetaminophen (TYLENOL) tablet 650 mg   q4 prn- used x3    oxyCODONE (ROXICODONE) immediate release tablet 5 mg q4 prn- used x1    oxyCODONE (ROXICODONE) immediate release tablet 10 mg q4 prn- used x1        Pancreatitis - Care Day 3 (8/26/2020) by Chris Leahy RN         Review Entered  Review Status 8/28/2020 15:21  Completed        Criteria Review       Care Day: 3 Care Date: 8/26/2020 Level of Care:    Guideline Day 2    Clinical Status    (X) * Hemodynamic stability    8/28/2020 3:21 PM EDT by Kailash Disla      69   136/79    ( ) * Pain absent or reduced    8/28/2020 3:21 PM EDT by Kailash Disla      Add po oxycodone prn for pain (pt requesting oral med) and cont dilaudid but limit use    Routes    ( ) Oral diet as tolerated    8/28/2020 3:21 PM EDT by Kailash Disla      NPO    Interventions    (X) * NG tube absent    8/28/2020 3:21 PM EDT by Kailash Disla      absent    Medications    (X) Parenteral analgesics    8/28/2020 3:21 PM EDT by Kailash Disla      po oxycodone prn and iv dilaudid    * Milestone    Additional Notes    8/26        Vs- 98.7 (37.1)   18   69   136/79   94% RA       Labs    8/26/2020 04:11    Sodium: 137    Potassium: 3.9    Chloride: 104    CO2: 23    BUN: 9    Creatinine: 0.5    Anion Gap: 10.0    Est, Glom Filt Rate: >90    Glucose: 65 (L)    Calcium: 8.4 (L)    Total Protein: 6.4    Albumin: 3.3 (L)    Alk Phos: 223 (H)    ALT: 111 (H)    AST: 61 (H)    Bilirubin: 0.9    Bilirubin, Direct: 0.5 (H)    Lipase: 19.8    WBC: 7.4    RBC: 3.79 (L)    Hemoglobin Quant: 12.9    Hematocrit: 38.8    MCV: 102.4 (H)    MCH: 34.0 (H)    MCHC: 33.2    MPV: 10.0    RDW-CV: 12.8    RDW-SD: 48.0 (H)    Platelet Count: 184    : 19       8/26/2020 12:51    Heparin LMW : 0.75          Per General Surgeon    Subjective: patient continues to have some abdominal pain but is getting better. Denies N&V and no bowel function, she is still NPO until GI sees. Assessment:    1. ? Pancreatitis  depsite normal amylase and lipase    2. SMV/PV thrombosis    3. Abnormal LFT    4. Morbid obesity    5. HX PE,    6. Surgical History, cholecystectomy and hernia repair with mesh       Plan:    1. Conservative treatment    2. Bowel Rest    3. GI on board    4. Oncology following    5.  IV hydration    6. Analgesia and antiemetics as needed    7. therapeutic Lovenox    8. Monitor Labs    9. No surgical intervention necessary, surgery signing off, call if needed            Per IM    -- 8/26 --> apprec consultants - cont NPO, IVF but decrease rate due to swelling and wt trending up.  Add po oxycodone prn for pain (pt requesting oral med) and cont dilaudid but limit use.  Awaiting Gi recs for further diet.  LFT trending down - cont monitor;  D/w Dr. Geoffrey Del Cid about anticoag plan as on therapeutic lovenox currently -> recommends eliquis at d/c - pt agreeable thus started this pm and lovenox stopped;  Cont monitor lytes, renal fxn, LFT, BP, fluid status, pain, CBC.           Assessment/Plan:     1. Acute pancreatitis -- ?due to mesenteric vein thrombosis but appear chronic as has collaterals per MRCP 8/25/2020 --> apprec GI assist -- lipase normal on admission 8/14 and again 8/25 and 8/26 but CLINICAL picture and MRI findings 8/25/2020 of pancreas appearing inflamed with enlarged and edematous pancreas and peripancreatic/mesenteric edema treating as acute pancreatitis    -- cont NPO, IVF but decrease to 100 mL/hr 8/26->    -- add po oxycodone 5-10 mg q 4 hrs prn pain, cont prn dilaudid for severe pain and monitor pain med use closely    -- no obstructive process noted per MRI abd 8/25/2020-  Hx cholecystectomy    -- Denies any significant ETOH use    -- Not on any medications to cause    -- check lipids 8/27    -- Dr. Flip Roa ordered LEWIS, factor 5 leiden, Antithrombin III, prothrombin,  on 8/25 and (p) as of 8/26/2020    2.  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 Geoffrey Del Cid -> d/w Dr. Geoffrey Del Cid 8/26 plans for [address] -> due to prior hx of clots (PE) and now current thrombosis no matter if chronic or acute this would indicate a 2nd clot and thus needs lifelong anticoagulation -> he recommended starting eliquis (pt has been on previously) thus started 10 mg bid 8/26 x 1 week then will need 5 mg bid.  Pt educated and agrees to tx.    -- hypercoag w/u as outpt per Dr. Ruben Willis    -- per MRI reading rec f/u CT/MRI in 1 mo to re-evaluate    -- Dr. Odalys Salcedo ordered LEWIS, factor 5 leiden, Antithrombin III, prothrombin,  on 8/25 and (p) as of 8/26/2020    -- liver u/s 8/24/2020 with Suboptimal evaluation of the hepatic vasculature due to multiple collateral vessels and likely chronic thrombosis of the portal vein    3. Upper abd pain -- pt clinically looks and acts like acute pancreatitis --> see tx as above --  pain did not improve with PPI/carafate as outpt thus less likely PUD    4. 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 These lesions do not enhance on the postcontrast images however appear to connect and have a serpiginous appearance and distribution suggesting    the possibility of thrombosed vessels                -- recommends f/u MRI or CT in 1 mo                -- await any further GI recs -> ?EUS/?bx vs f/u MRI/CT    5. Elevated LFT, hyperbilirubinemia -- likely due to #1,2 as normal 8/19/2020  -- GI following - improving slightly each day -> on 8/26 = AST 61 (216 on admission),  (205), alk phos 223 (251), bili 0.9 (1.9) --> cont trend -- avoid APAP for now --per MRI abd 8/25 = lobular contour of the liver. The hepatic parenchymal echogenicity is within normal limits    -- check hepatitis panel 8/27    -- Dr. Odalys Salcedo ordered LEWIS, factor 5 leiden, Antithrombin III, prothrombin,  on 8/25 and (p) as of 8/26/2020    6.  Lymphadenopathy along the gastrohepatic ligament, within the periportal region, adjacent to the pancreatic head and within the periceliac region -- noted per MRCP 8/25/2020 - notes \"benign and malignant etiologies should be considered\" -- await further GI recs - ? EUS - ?bx vs f/u CTP/MRI -- await recs    7. Macrocytosis -- likely related to splenectomy? -- check B12/folate 8/27    8. Hx ITP s/p splenectomy -- plts stable in 200's    9. Hx prior PE -- was on coumadin in past and eliquis for DVT prophylaxis for foot surgery in past -- see #2 -- no signs of PE or DVT. 10. Hx ventral hernia with mesh repair -- apprec Dr. Emily cao - not a current issue. 6. Hx cholecystectomy -- no significant duct dilation on MRCP 8/25/2020    12. Morbid Obesity Body mass index is 43.53 kg/m². Per Hem/Onc    Reviewed case.  Whether surgery thinks the patient cannot have pancreatitis because the lipase is not elevated or not, she has pancreatic inflammation.  She also has what looks like a pseudocyst.  Her clotting did not cause acute symptoms if it is chronic.  She has not seen me for 3 years, and I never saw her for a clotting disorder.  I saw her for pancytopenia and ITP, both of which resolved after splenectomy years ago.      The patient can be investigated for hypercoagulable state as an outpatient.  The fact remains that she had a history of a PE, of which I was never involved in her treatment. She was on Eliquis then. Carmen Jose has developed a second episode of clotting in her lifetime, so she requires lifelong anticoagulation, whether she has a heritable cause or not. Spoke with Dr Zohreh Leyva today and answered Dr Carmencita Larsen yesterday to start Eliquis again. Hyacinth Olivares already placed her on Lovenox at therapeutic dosing since date of admission.   Can restart Eliquis when next Lovenox dose due and transition her.  Will follow as an outpatient from here. Anna Grant pancreas/pancreatitis issue can be addressed by GI service and/or surgery service. Meds    0.9 % sodium chloride infusion @ 100 ml/h       apixaban (ELIQUIS) tablet 10 mg  bid    pantoprazole (PROTONIX) injection 40 mg   daily       acetaminophen (TYLENOL) tablet 650 mg   q4 prn- used x2       HYDROmorphone (DILAUDID) injection 0.5 mg   q3 prn- used x2       oxyCODONE (ROXICODONE) immediate release tablet 10 mg q4 prn- used x3

## 2020-09-21 ENCOUNTER — TELEPHONE (OUTPATIENT)
Dept: FAMILY MEDICINE CLINIC | Age: 40
End: 2020-09-21

## 2020-09-21 NOTE — TELEPHONE ENCOUNTER
Ricardo Adams called in. [facility] is requesting a RTW slip. She returned on 9/10/20.     Fax to [phone]

## 2021-06-07 DIAGNOSIS — R10.13 EPIGASTRIC PAIN: ICD-10-CM

## 2021-06-07 DIAGNOSIS — Z87.11 HISTORY OF PEPTIC ULCER DISEASE: ICD-10-CM

## 2021-06-07 RX ORDER — OMEPRAZOLE 40 MG/1
40 CAPSULE, DELAYED RELEASE ORAL DAILY
Qty: 90 CAPSULE | Refills: 0 | OUTPATIENT
Start: 2021-06-07

## 2022-10-17 ENCOUNTER — HOSPITAL ENCOUNTER (OUTPATIENT)
Age: 42
Discharge: HOME | End: 2022-10-17
Payer: COMMERCIAL

## 2022-10-17 LAB
ALBUMIN SERPL-MCNC: 3.6 G/DL (ref 3.5–5.1)
ALP BLD-CCNC: 85 U/L (ref 38–126)
ALT SERPL-CCNC: 20 U/L (ref 11–66)
ANION GAP SERPL CALCULATED.3IONS-SCNC: 10 MEQ/L (ref 8–16)
AST SERPL-CCNC: 31 U/L (ref 5–40)
BASOPHILS # BLD: 0.6 %
BASOPHILS ABSOLUTE: 0 THOU/MM3 (ref 0–0.1)
BILIRUB SERPL-MCNC: 0.9 MG/DL (ref 0.3–1.2)
BUN BLDV-MCNC: 14 MG/DL (ref 7–22)
CALCIUM SERPL-MCNC: 8.8 MG/DL (ref 8.5–10.5)
CHLORIDE BLD-SCNC: 105 MEQ/L (ref 98–111)
CO2: 25 MEQ/L (ref 23–33)
CREAT SERPL-MCNC: 0.5 MG/DL (ref 0.4–1.2)
EOSINOPHIL # BLD: 4.1 %
EOSINOPHILS ABSOLUTE: 0.3 THOU/MM3 (ref 0–0.4)
ERYTHROCYTE [DISTWIDTH] IN BLOOD BY AUTOMATED COUNT: 14.1 % (ref 11.5–14.5)
ERYTHROCYTE [DISTWIDTH] IN BLOOD BY AUTOMATED COUNT: 52.6 FL (ref 35–45)
GFR SERPL CREATININE-BSD FRML MDRD: > 90 ML/MIN/1.73M2
GLUCOSE BLD-MCNC: 105 MG/DL (ref 70–108)
HCT VFR BLD CALC: 43.5 % (ref 37–47)
HEMOGLOBIN: 14.7 GM/DL (ref 12–16)
IMMATURE GRANS (ABS): 0.01 THOU/MM3 (ref 0–0.07)
IMMATURE GRANULOCYTES: 0.1 %
LYMPHOCYTES # BLD: 34.3 %
LYMPHOCYTES ABSOLUTE: 2.4 THOU/MM3 (ref 1–4.8)
MCH RBC QN AUTO: 34 PG (ref 26–33)
MCHC RBC AUTO-ENTMCNC: 33.8 GM/DL (ref 32.2–35.5)
MCV RBC AUTO: 100.7 FL (ref 81–99)
MONOCYTES # BLD: 12.2 %
MONOCYTES ABSOLUTE: 0.9 THOU/MM3 (ref 0.4–1.3)
NUCLEATED RED BLOOD CELLS: 0 /100 WBC
PLATELET # BLD: 235 THOU/MM3 (ref 130–400)
PMV BLD AUTO: 10.8 FL (ref 9.4–12.4)
POTASSIUM SERPL-SCNC: 4.1 MEQ/L (ref 3.5–5.2)
RBC # BLD: 4.32 MILL/MM3 (ref 4.2–5.4)
SEG NEUTROPHILS: 48.7 %
SEGMENTED NEUTROPHILS ABSOLUTE COUNT: 3.4 THOU/MM3 (ref 1.8–7.7)
SODIUM BLD-SCNC: 140 MEQ/L (ref 135–145)
TOTAL PROTEIN: 6.4 G/DL (ref 6.1–8)
WBC # BLD: 7 THOU/MM3 (ref 4.8–10.8)

## 2022-10-17 PROCEDURE — 80053 COMPREHEN METABOLIC PANEL: CPT

## 2022-10-17 PROCEDURE — 85025 COMPLETE CBC W/AUTO DIFF WBC: CPT

## 2022-10-17 PROCEDURE — 36415 COLL VENOUS BLD VENIPUNCTURE: CPT

## 2023-03-07 DIAGNOSIS — T75.3XXA MOTION SICKNESS, INITIAL ENCOUNTER: Primary | ICD-10-CM

## 2023-03-07 RX ORDER — SCOLOPAMINE TRANSDERMAL SYSTEM 1 MG/1
1 PATCH, EXTENDED RELEASE TRANSDERMAL
Qty: 5 PATCH | Refills: 0 | Status: SHIPPED | OUTPATIENT
Start: 2023-03-07

## 2023-10-11 ENCOUNTER — HOSPITAL ENCOUNTER (OUTPATIENT)
Age: 43
Discharge: HOME | End: 2023-10-11
Payer: COMMERCIAL

## 2023-10-11 LAB
ALBUMIN SERPL BCG-MCNC: 4 G/DL (ref 3.5–5.1)
ALP SERPL-CCNC: 80 U/L (ref 38–126)
ALT SERPL W/O P-5'-P-CCNC: 20 U/L (ref 11–66)
ANION GAP SERPL CALC-SCNC: 12 MEQ/L (ref 8–16)
AST SERPL-CCNC: 28 U/L (ref 5–40)
BASOPHILS ABSOLUTE: 0 THOU/MM3 (ref 0–0.1)
BASOPHILS NFR BLD AUTO: 0.6 %
BILIRUB SERPL-MCNC: 0.7 MG/DL (ref 0.3–1.2)
BUN SERPL-MCNC: 10 MG/DL (ref 7–22)
CALCIUM SERPL-MCNC: 8.8 MG/DL (ref 8.5–10.5)
CHLORIDE SERPL-SCNC: 106 MEQ/L (ref 98–111)
CO2 SERPL-SCNC: 25 MEQ/L (ref 23–33)
CREAT SERPL-MCNC: 0.6 MG/DL (ref 0.4–1.2)
DEPRECATED RDW RBC AUTO: 54.4 FL (ref 35–45)
EOSINOPHIL NFR BLD AUTO: 4.9 %
EOSINOPHILS ABSOLUTE: 0.3 THOU/MM3 (ref 0–0.4)
ERYTHROCYTE [DISTWIDTH] IN BLOOD BY AUTOMATED COUNT: 14.3 % (ref 11.5–14.5)
GFR SERPL CREATININE-BSD FRML MDRD: > 60 ML/MIN/1.73M2
GLUCOSE SERPL-MCNC: 69 MG/DL (ref 70–108)
HCT VFR BLD AUTO: 43.5 % (ref 37–47)
HGB BLD-MCNC: 14 GM/DL (ref 12–16)
IMM GRANULOCYTES # BLD AUTO: 0.01 THOU/MM3 (ref 0–0.07)
IMM GRANULOCYTES NFR BLD AUTO: 0.2 %
LYMPHOCYTES ABSOLUTE: 1.8 THOU/MM3 (ref 1–4.8)
LYMPHOCYTES NFR BLD AUTO: 34.5 %
MCH RBC QN AUTO: 33.1 PG (ref 26–33)
MCHC RBC AUTO-ENTMCNC: 32.2 GM/DL (ref 32.2–35.5)
MCV RBC AUTO: 102.8 FL (ref 81–99)
MONOCYTES ABSOLUTE: 0.7 THOU/MM3 (ref 0.4–1.3)
MONOCYTES NFR BLD AUTO: 13.6 %
NEUTROPHILS NFR BLD AUTO: 46.2 %
NRBC BLD AUTO-RTO: 0 /100 WBC
PLATELET # BLD AUTO: 241 THOU/MM3 (ref 130–400)
PMV BLD AUTO: 11 FL (ref 9.4–12.4)
POTASSIUM SERPL-SCNC: 4 MEQ/L (ref 3.5–5.2)
PROT SERPL-MCNC: 6.6 G/DL (ref 6.1–8)
RBC # BLD AUTO: 4.23 MILL/MM3 (ref 4.2–5.4)
SEGMENTED NEUTROPHILS ABSOLUTE COUNT: 2.4 THOU/MM3 (ref 1.8–7.7)
SODIUM SERPL-SCNC: 143 MEQ/L (ref 135–145)
WBC # BLD AUTO: 5.3 THOU/MM3 (ref 4.8–10.8)

## 2023-10-11 PROCEDURE — 85025 COMPLETE CBC W/AUTO DIFF WBC: CPT

## 2023-10-11 PROCEDURE — 80053 COMPREHEN METABOLIC PANEL: CPT

## 2023-10-11 PROCEDURE — 36415 COLL VENOUS BLD VENIPUNCTURE: CPT

## 2024-07-09 DIAGNOSIS — T75.3XXA MOTION SICKNESS, INITIAL ENCOUNTER: ICD-10-CM

## 2024-07-10 RX ORDER — SCOLOPAMINE TRANSDERMAL SYSTEM 1 MG/1
1 PATCH, EXTENDED RELEASE TRANSDERMAL
Qty: 5 PATCH | Refills: 0 | Status: SHIPPED | OUTPATIENT
Start: 2024-07-10

## 2024-07-10 NOTE — TELEPHONE ENCOUNTER
Last visit- Visit date not found  Next visit- Visit date not found    Requested Prescriptions     Pending Prescriptions Disp Refills    scopolamine (TRANSDERM-SCOP) transdermal patch 5 patch 0     Sig: Place 1 patch onto the skin every 72 hours

## 2024-09-16 ENCOUNTER — TELEMEDICINE (OUTPATIENT)
Dept: FAMILY MEDICINE CLINIC | Age: 44
End: 2024-09-16
Payer: COMMERCIAL

## 2024-09-16 DIAGNOSIS — B96.89 ACUTE BACTERIAL SINUSITIS: Primary | ICD-10-CM

## 2024-09-16 DIAGNOSIS — J01.90 ACUTE BACTERIAL SINUSITIS: Primary | ICD-10-CM

## 2024-09-16 PROCEDURE — 99213 OFFICE O/P EST LOW 20 MIN: CPT | Performed by: FAMILY MEDICINE

## 2024-09-16 RX ORDER — ALBUTEROL SULFATE 90 UG/1
2 INHALANT RESPIRATORY (INHALATION) 4 TIMES DAILY PRN
Qty: 18 G | Refills: 0 | Status: SHIPPED | OUTPATIENT
Start: 2024-09-16

## 2024-09-16 RX ORDER — PREDNISONE 20 MG/1
40 TABLET ORAL DAILY
Qty: 10 TABLET | Refills: 0 | Status: SHIPPED | OUTPATIENT
Start: 2024-09-16 | End: 2024-09-19 | Stop reason: SDUPTHER

## 2024-09-16 RX ORDER — AZITHROMYCIN 250 MG/1
TABLET, FILM COATED ORAL
Qty: 6 TABLET | Refills: 0 | Status: SHIPPED | OUTPATIENT
Start: 2024-09-16 | End: 2024-09-19 | Stop reason: SDUPTHER

## 2024-09-19 DIAGNOSIS — J01.90 ACUTE BACTERIAL SINUSITIS: ICD-10-CM

## 2024-09-19 DIAGNOSIS — B96.89 ACUTE BACTERIAL SINUSITIS: ICD-10-CM

## 2024-09-20 RX ORDER — AZITHROMYCIN 250 MG/1
TABLET, FILM COATED ORAL
Qty: 6 TABLET | Refills: 0 | Status: SHIPPED | OUTPATIENT
Start: 2024-09-20

## 2024-09-20 RX ORDER — PREDNISONE 20 MG/1
40 TABLET ORAL DAILY
Qty: 10 TABLET | Refills: 0 | Status: SHIPPED | OUTPATIENT
Start: 2024-09-20

## 2024-10-10 ENCOUNTER — HOSPITAL ENCOUNTER (OUTPATIENT)
Age: 44
Discharge: HOME | End: 2024-10-10
Payer: COMMERCIAL

## 2024-10-10 LAB
ALBUMIN SERPL BCG-MCNC: 3.6 G/DL (ref 3.5–5.1)
ALP SERPL-CCNC: 85 U/L (ref 38–126)
ALT SERPL W/O P-5'-P-CCNC: 30 U/L (ref 11–66)
ANION GAP SERPL CALC-SCNC: 9 MEQ/L (ref 8–16)
AST SERPL-CCNC: 41 U/L (ref 5–40)
BASOPHILS ABSOLUTE: 0 THOU/MM3 (ref 0–0.1)
BASOPHILS NFR BLD AUTO: 0.5 %
BILIRUB SERPL-MCNC: 0.9 MG/DL (ref 0.3–1.2)
BUN SERPL-MCNC: 9 MG/DL (ref 7–22)
CALCIUM SERPL-MCNC: 8.7 MG/DL (ref 8.5–10.5)
CHLORIDE SERPL-SCNC: 106 MEQ/L (ref 98–111)
CO2 SERPL-SCNC: 24 MEQ/L (ref 23–33)
CREAT SERPL-MCNC: 0.6 MG/DL (ref 0.4–1.2)
DEPRECATED RDW RBC AUTO: 54.9 FL (ref 35–45)
EOSINOPHIL NFR BLD AUTO: 6.8 %
EOSINOPHILS ABSOLUTE: 0.3 THOU/MM3 (ref 0–0.4)
ERYTHROCYTE [DISTWIDTH] IN BLOOD BY AUTOMATED COUNT: 14.8 % (ref 11.5–14.5)
GFR SERPL CREATININE-BSD FRML MDRD: > 90 ML/MIN/1.73M2
GLUCOSE SERPL-MCNC: 134 MG/DL (ref 70–108)
HCT VFR BLD AUTO: 41.8 % (ref 37–47)
HGB BLD-MCNC: 14 GM/DL (ref 12–16)
IMM GRANULOCYTES # BLD AUTO: 0.01 THOU/MM3 (ref 0–0.07)
IMM GRANULOCYTES NFR BLD AUTO: 0.2 %
LYMPHOCYTES ABSOLUTE: 1.8 THOU/MM3 (ref 1–4.8)
LYMPHOCYTES NFR BLD AUTO: 41.7 %
MCH RBC QN AUTO: 33.8 PG (ref 26–33)
MCHC RBC AUTO-ENTMCNC: 33.5 GM/DL (ref 32.2–35.5)
MCV RBC AUTO: 101 FL (ref 81–99)
MONOCYTES ABSOLUTE: 0.5 THOU/MM3 (ref 0.4–1.3)
MONOCYTES NFR BLD AUTO: 13 %
NEUTROPHILS ABSOLUTE: 1.6 THOU/MM3 (ref 1.8–7.7)
NEUTROPHILS NFR BLD AUTO: 37.8 %
NRBC BLD AUTO-RTO: 0 /100 WBC
PLATELET # BLD AUTO: 238 THOU/MM3 (ref 130–400)
PMV BLD AUTO: 10.5 FL (ref 9.4–12.4)
POTASSIUM SERPL-SCNC: 4.1 MEQ/L (ref 3.5–5.2)
PROT SERPL-MCNC: 6.3 G/DL (ref 6.1–8)
RBC # BLD AUTO: 4.14 MILL/MM3 (ref 4.2–5.4)
SODIUM SERPL-SCNC: 139 MEQ/L (ref 135–145)
WBC # BLD AUTO: 4.2 THOU/MM3 (ref 4.8–10.8)

## 2024-10-10 PROCEDURE — 36415 COLL VENOUS BLD VENIPUNCTURE: CPT

## 2024-10-10 PROCEDURE — 80053 COMPREHEN METABOLIC PANEL: CPT

## 2024-10-10 PROCEDURE — 85025 COMPLETE CBC W/AUTO DIFF WBC: CPT

## 2025-05-22 DIAGNOSIS — B96.89 ACUTE BACTERIAL SINUSITIS: ICD-10-CM

## 2025-05-22 DIAGNOSIS — J01.90 ACUTE BACTERIAL SINUSITIS: ICD-10-CM

## 2025-05-23 RX ORDER — AZITHROMYCIN 250 MG/1
TABLET, FILM COATED ORAL
Qty: 6 TABLET | Refills: 0 | Status: SHIPPED | OUTPATIENT
Start: 2025-05-23

## 2025-05-23 NOTE — TELEPHONE ENCOUNTER
Last visit- 9/16/2024  Next visit- Visit date not found    Requested Prescriptions     Pending Prescriptions Disp Refills    azithromycin (ZITHROMAX Z-OSCAR) 250 MG tablet 6 tablet 0     Sig: Take two (2) tablets by mouth on day 1, then take one (1) tablet by mouth daily for four (4) days.

## 2025-07-08 ENCOUNTER — HOSPITAL ENCOUNTER (OUTPATIENT)
Age: 45
Discharge: HOME | End: 2025-07-08
Payer: COMMERCIAL

## 2025-07-08 LAB
ANION GAP SERPL CALC-SCNC: 8 MEQ/L (ref 8–16)
BUN SERPL-MCNC: 8 MG/DL (ref 8–23)
CALCIUM SERPL-MCNC: 9 MG/DL (ref 8.6–10)
CHLORIDE SERPL-SCNC: 107 MEQ/L (ref 98–111)
CO2 SERPL-SCNC: 24 MEQ/L (ref 22–29)
CREAT SERPL-MCNC: 1 MG/DL (ref 0.5–0.9)
GFR SERPL CREATININE-BSD FRML MDRD: 71 ML/MIN/1.73M2
GLUCOSE SERPL-MCNC: 87 MG/DL (ref 74–109)
POTASSIUM SERPL-SCNC: 4.2 MEQ/L (ref 3.5–5.2)
SODIUM SERPL-SCNC: 139 MEQ/L (ref 135–145)

## 2025-07-08 PROCEDURE — 36415 COLL VENOUS BLD VENIPUNCTURE: CPT

## 2025-07-08 PROCEDURE — 80048 BASIC METABOLIC PNL TOTAL CA: CPT
